# Patient Record
Sex: FEMALE | URBAN - METROPOLITAN AREA
[De-identification: names, ages, dates, MRNs, and addresses within clinical notes are randomized per-mention and may not be internally consistent; named-entity substitution may affect disease eponyms.]

---

## 2020-07-20 ENCOUNTER — APPOINTMENT (OUTPATIENT)
Dept: URBAN - METROPOLITAN AREA CLINIC 319 | Age: 80
Setting detail: DERMATOLOGY
End: 2020-07-20

## 2020-07-20 DIAGNOSIS — L82.1 OTHER SEBORRHEIC KERATOSIS: ICD-10-CM

## 2020-07-20 DIAGNOSIS — D22 MELANOCYTIC NEVI: ICD-10-CM

## 2020-07-20 DIAGNOSIS — Z71.89 OTHER SPECIFIED COUNSELING: ICD-10-CM

## 2020-07-20 DIAGNOSIS — L82.0 INFLAMED SEBORRHEIC KERATOSIS: ICD-10-CM

## 2020-07-20 PROBLEM — D22.9 MELANOCYTIC NEVI, UNSPECIFIED: Status: ACTIVE | Noted: 2020-07-20

## 2020-07-20 PROCEDURE — 17110 DESTRUCT B9 LESION 1-14: CPT

## 2020-07-20 PROCEDURE — OTHER COUNSELING: OTHER

## 2020-07-20 PROCEDURE — OTHER LIQUID NITROGEN: OTHER

## 2020-07-20 PROCEDURE — OTHER MIPS QUALITY: OTHER

## 2020-07-20 PROCEDURE — 99203 OFFICE O/P NEW LOW 30 MIN: CPT | Mod: 25

## 2020-07-20 PROCEDURE — OTHER SUNSCREEN RECOMMENDATIONS: OTHER

## 2020-07-20 PROCEDURE — OTHER REASSURANCE: OTHER

## 2020-07-20 ASSESSMENT — LOCATION SIMPLE DESCRIPTION DERM: LOCATION SIMPLE: RIGHT ANTERIOR NECK

## 2020-07-20 ASSESSMENT — LOCATION DETAILED DESCRIPTION DERM: LOCATION DETAILED: RIGHT INFERIOR LATERAL NECK

## 2020-07-20 ASSESSMENT — LOCATION ZONE DERM: LOCATION ZONE: NECK

## 2020-07-20 NOTE — PROCEDURE: LIQUID NITROGEN
Medical Necessity Information: It is in your best interest to select a reason for this procedure from the list below. All of these items fulfill various CMS LCD requirements except the new and changing color options.
Duration Of Freeze Thaw-Cycle (Seconds): 5-10
Detail Level: Detailed
Add 52 Modifier (Optional): no
Medical Necessity Clause: This procedure was medically necessary because the lesions that were treated were:
Consent: The patient's consent was obtained including but not limited to risks of crusting, scabbing, blistering, scarring, darker or lighter pigmentary change, recurrence, incomplete removal and infection.
Number Of Freeze-Thaw Cycles: 1 freeze-thaw cycle
Post-Care Instructions: I reviewed with the patient in detail post-care instructions. Patient is to wear sunprotection, and avoid picking at any of the treated lesions. Pt may apply Vaseline to crusted or scabbing areas.

## 2022-09-12 ENCOUNTER — APPOINTMENT (OUTPATIENT)
Dept: URBAN - METROPOLITAN AREA CLINIC 319 | Age: 82
Setting detail: DERMATOLOGY
End: 2022-09-12

## 2022-09-12 DIAGNOSIS — D18.0 HEMANGIOMA: ICD-10-CM

## 2022-09-12 DIAGNOSIS — D49.2 NEOPLASM OF UNSPECIFIED BEHAVIOR OF BONE, SOFT TISSUE, AND SKIN: ICD-10-CM

## 2022-09-12 DIAGNOSIS — L82.1 OTHER SEBORRHEIC KERATOSIS: ICD-10-CM

## 2022-09-12 DIAGNOSIS — D22 MELANOCYTIC NEVI: ICD-10-CM

## 2022-09-12 DIAGNOSIS — Z12.83 ENCOUNTER FOR SCREENING FOR MALIGNANT NEOPLASM OF SKIN: ICD-10-CM

## 2022-09-12 PROBLEM — D18.01 HEMANGIOMA OF SKIN AND SUBCUTANEOUS TISSUE: Status: ACTIVE | Noted: 2022-09-12

## 2022-09-12 PROBLEM — D22.5 MELANOCYTIC NEVI OF TRUNK: Status: ACTIVE | Noted: 2022-09-12

## 2022-09-12 PROCEDURE — 11102 TANGNTL BX SKIN SINGLE LES: CPT

## 2022-09-12 PROCEDURE — 99213 OFFICE O/P EST LOW 20 MIN: CPT | Mod: 25

## 2022-09-12 PROCEDURE — OTHER MIPS QUALITY: OTHER

## 2022-09-12 PROCEDURE — OTHER PHOTO-DOCUMENTATION: OTHER

## 2022-09-12 PROCEDURE — OTHER COUNSELING: OTHER

## 2022-09-12 PROCEDURE — OTHER REASSURANCE: OTHER

## 2022-09-12 PROCEDURE — OTHER BIOPSY BY SHAVE METHOD: OTHER

## 2022-09-12 ASSESSMENT — LOCATION DETAILED DESCRIPTION DERM
LOCATION DETAILED: RIGHT INFERIOR UPPER BACK
LOCATION DETAILED: NASAL SUPRATIP
LOCATION DETAILED: LEFT ANTERIOR PROXIMAL UPPER ARM
LOCATION DETAILED: RIGHT SUPERIOR MEDIAL UPPER BACK
LOCATION DETAILED: RIGHT ANTERIOR PROXIMAL UPPER ARM
LOCATION DETAILED: PERIUMBILICAL SKIN

## 2022-09-12 ASSESSMENT — LOCATION SIMPLE DESCRIPTION DERM
LOCATION SIMPLE: RIGHT UPPER ARM
LOCATION SIMPLE: LEFT UPPER ARM
LOCATION SIMPLE: RIGHT UPPER BACK
LOCATION SIMPLE: ABDOMEN
LOCATION SIMPLE: NOSE

## 2022-09-12 ASSESSMENT — LOCATION ZONE DERM
LOCATION ZONE: TRUNK
LOCATION ZONE: ARM
LOCATION ZONE: NOSE

## 2022-09-12 NOTE — PROCEDURE: BIOPSY BY SHAVE METHOD
Hide Anticipated Plan (Based On Presumed Biopsy Results)?: No
Hemostasis: Electrodesiccation
Post-Care Instructions: I reviewed with the patient in detail post-care instructions. Patient is to keep the biopsy site dry overnight, and then apply bacitracin twice daily until healed. Patient may apply hydrogen peroxide soaks to remove any crusting.
Billing Type: Third-Party Bill
Biopsy Method: Dermablade
Was A Bandage Applied: Yes
Curettage Text: The wound bed was treated with curettage after the biopsy was performed.
Biopsy Type: H and E
Electrodesiccation Text: The wound bed was treated with electrodesiccation after the biopsy was performed.
X Size Of Lesion In Cm: 0
Depth Of Biopsy: dermis
Notification Instructions: Patient will be notified of biopsy results. However, patient instructed to call the office if not contacted within 2 weeks.
Detail Level: Detailed
Electrodesiccation And Curettage Text: The wound bed was treated with electrodesiccation and curettage after the biopsy was performed.
Dressing: Band-Aid
Silver Nitrate Text: The wound bed was treated with silver nitrate after the biopsy was performed.
Consent: Written consent was obtained and risks were reviewed including but not limited to scarring, infection, bleeding, scabbing, incomplete removal, nerve damage and allergy to anesthesia.
Anesthesia Volume In Cc (Will Not Render If 0): 0.5
Anesthesia Type: 1% lidocaine with epinephrine
Wound Care: Aquaphor
Type Of Destruction Used: Cryotherapy
Information: Selecting Yes will display possible errors in your note based on the variables you have selected. This validation is only offered as a suggestion for you. PLEASE NOTE THAT THE VALIDATION TEXT WILL BE REMOVED WHEN YOU FINALIZE YOUR NOTE. IF YOU WANT TO FAX A PRELIMINARY NOTE YOU WILL NEED TO TOGGLE THIS TO 'NO' IF YOU DO NOT WANT IT IN YOUR FAXED NOTE.
Cryotherapy Text: The wound bed was treated with cryotherapy after the biopsy was performed.

## 2022-09-12 NOTE — PROCEDURE: MIPS QUALITY
Quality 226: Preventive Care And Screening: Tobacco Use: Screening And Cessation Intervention: Patient screened for tobacco use and is an ex/non-smoker
Quality 111:Pneumonia Vaccination Status For Older Adults: Pneumococcal vaccine (PPSV23) administered on or after patient’s 60th birthday and before the end of the measurement period
Quality 431: Preventive Care And Screening: Unhealthy Alcohol Use - Screening: Patient not identified as an unhealthy alcohol user when screened for unhealthy alcohol use using a systematic screening method
Quality 130: Documentation Of Current Medications In The Medical Record: Current Medications Documented
Quality 110: Preventive Care And Screening: Influenza Immunization: Influenza Immunization Administered during Influenza season
Detail Level: Detailed

## 2022-10-20 ENCOUNTER — APPOINTMENT (OUTPATIENT)
Dept: URBAN - METROPOLITAN AREA CLINIC 319 | Age: 82
Setting detail: DERMATOLOGY
End: 2022-10-25

## 2022-10-20 PROBLEM — C44.311 BASAL CELL CARCINOMA OF SKIN OF NOSE: Status: ACTIVE | Noted: 2022-10-20

## 2022-10-20 PROCEDURE — G6001 ECHO GUIDANCE RADIOTHERAPY: HCPCS

## 2022-10-20 PROCEDURE — 77280 THER RAD SIMULAJ FIELD SMPL: CPT

## 2022-10-20 PROCEDURE — OTHER SUPERFICIAL RADIATION TREATMENT: OTHER

## 2022-10-20 PROCEDURE — OTHER FOLLOW UP FOR NEXT VISIT: OTHER

## 2022-10-20 PROCEDURE — 77300 RADIATION THERAPY DOSE PLAN: CPT

## 2022-10-20 PROCEDURE — OTHER TREATMENT REGIMEN: OTHER

## 2022-10-20 PROCEDURE — 77261 THER RADIOLOGY TX PLNG SMPL: CPT

## 2022-10-20 PROCEDURE — 77332 RADIATION TREATMENT AID(S): CPT

## 2022-10-20 NOTE — PROCEDURE: SUPERFICIAL RADIATION TREATMENT
Render Patient Eligibility And Selection In Note?: Yes
Prescription Used: 1
Dimensions-X Axis In Cm: 1.2
Include Rx 5 When Rendering Additional Prescriptions: No
Information: Selecting Yes will display possible errors in your note based on the variables you have selected. This validation is only offered as a suggestion for you. PLEASE NOTE THAT THE VALIDATION TEXT WILL BE REMOVED WHEN YOU FINALIZE YOUR NOTE. IF YOU WANT TO FAX A PRELIMINARY NOTE YOU WILL NEED TO TOGGLE THIS TO 'NO' IF YOU DO NOT WANT IT IN YOUR FAXED NOTE.
Ssd In Cm (Optional): 15
Total Dose (Optional-Please Include Units): 5553.00 cGy
Port Dimensions-X Axis In Cm: 1.7
Patient Positioning: Sitting
Initial Radiation Treatment Planning (Will Render If Bill Simulation = Yes): The patient had a complete consultation regarding all applicable modalities for the treatment of their skin cancer and based on a variety of factors including the type of tumor, size, and location, the relevant medical history as well as local tissue factors, the functional status of the individual, the ability to perform necessary postoperative wound instructions and the need for simultaneous treatments as well as overall wound healing status, it was determined that the patient would begin radiation therapy treatment for skin cancer.  A full simulation and treatment device design was performed including the determination and formulation of appropriate simple and complex devices including lead shield of 0.762 mm thickness to form molded customized shielding to specifically correlate with the lesion size including treatment margin.  The custom lead shield is adequate to accommodate the appropriate applicator and provide adequate shielding around the treatment site.  The specific field applicator, shields, and devices both simple and complex as well as the specific patient setup is outlined below.  The patient was given a full consent for superficial radiation to both verbally and in writing and the full determination of patient's eligibility for treatment and selection is outlined on the patient eligibility and treatment selection form.  The specific superficial radiotherapy prescription was determined and was documented on the superficial radiotherapy prescription form.  A treatment calculation was also performed and documented on the treatment calculation form.  Based on the prescription, the patient was scheduled for a series of fractional treatments.
Intro Statement (Will Not Render If Left Blank): Plan: Per the request of Dr. Arias, the patient was seen today for Superficial Radiation Therapy planning requiring initial simulation \\tayler preparation for treatment of specific diseased sites. Simulation is necessary to determine the correct patient and treatment \\nportal positioning, to deliver safe and effective radiation therapy. A high-frequency ultrasound image was acquired prior to \\ntreatment today for two- dimensional evaluation of tumor volume and response to treatment throughout course of care, in \\naddition, to geometric accuracy of field placement. Physician evaluation of the ultrasound tumor depth, width, and breadth will \\nbe ongoing through the course of treatment and is deemed medically necessary ensuring efficacy of treatment. Today’s image \\nand setup were evaluated to determine the initiation of treatment with the current plan or necessary changes as appropriate. \\Eleazar appropriate custom blocking and treatment parameters were verified by the radiation therapist according to the initial \\nsimulation. Ultrasound image guidance and field placement prior to treatment delivery were performed. \\n\\nUltrasound depth is 1.40 mm \\n\\nPer Dr. Arias, continued daily ultrasound guidance and simulation are required for field placement, measurement of tumor depth,\\nwidth and breadth, progress, and edema monitoring. \\n\\nPer the request of Dr. Arias , continuing medical physics review as per radiotherapy standard of care post every 5th fraction for the patient, including assessment of treatment parameters,  of dose delivery, and review of patient treatment \\ndocumentation in support the provider, is ordered, ensuring efficacy and continued safe delivery of radiotherapy. Included \\tayler the physics check is a review of patient setup information, all pertinent simulation and treatment photograph(s) checks, \\nprescription, dose calculation verification, daily dose charted correctly, elapsed days and treatment days correctly charted, \\ncumulative dose correct, and review of any prescription changes. Continued medical physics review post every 5th fraction of \\nradiotherapy is requested by the provider for appropriate radiotherapy management and is deemed medically necessary and \\nstandard of care.
Depth (Optional-Please Include Units): 2.86
Fractions / Week Rx 4: 5
Functional Status: 0 (fully active)
Field Size (Applicator): 2.5 cm
Treatment Time In Min (Optional): .45
Simple Simulation Preamble Text Will Be Included With Simple Simulations (.......... Indications): Simple simulation was performed today for the following reasons:
Please Choose The Type Of Visit (Required): Treatment Planning Visit: Show Non-Treatment Variables
Use Automated Fraction Number And Cumulative Dosage?: No (Maintain Current Freetext Entry)
Energy (Optional-Please Include Units): 100 kV
Time Dose Fractionation (Optional- Include Units If Applicable): 99
Ultrasound Used Text: Ultrasound was utilized to place radiation therapy fields.
Treatment Time / Fractionation (Optional- Include Units): .45 min
Dose / Tx In Cgy (Optional): 277.65
Total Number Of Fractions: 20
Assessment: Appropriate reaction
Custom Shielding Preamble Text Will Not Be Included With Simple Simulations (.......... X X Y Cm): A lead shield of 0.762 mm thickness is utilized to form a molded, custom shield with a
Ultrasound Used Text: .
Radiation Units: cGy
Treatment Device Design After Initial Simulation Justification (Will Render If Bill For Treatment Devices = Yes): The patient is status post radiation simulation and is evaluated as to the use of additional devices for shielding and placement for radiation therapy.
Detail Level: Detailed
Fractions / Week: 3
Fractionation Number: 0
Field Size (Applicator): 2.0 cm
Treatment Margins In Cm: 0.5
Energy (Include Units): 100
Custom Shielding Afterword Text Will Not Be Included With Simple Simulations (X X Y Cm............): port to correlate with the lesion size, including treatment margin. The custom lead shield is adequate to accommodate the appropriate applicator and provide adequate shielding around the treatment site. Additional shielding (as noted below) is used to protect sensitive, normal tissues.
Daily Fractionated Dose (Optional- Include Units): 277.65 cGy

## 2022-10-20 NOTE — PROCEDURE: TREATMENT REGIMEN
Plan: Per the request of Dr. ROSE Arias, the patient was seen today for Superficial Radiation Therapy planning \\nrequiring initial simulation in preparation for treatment of specific diseased sites. Simulation is necessary to \\ndetermine the correct patient and treatment portal positioning, to deliver safe and effective radiation \\ntherapy. A high-frequency ultrasound image was acquired prior to treatment today for two- dimensional \\nevaluation of tumor volume and will be repeated per fraction for response to treatment, in addition, to \\ngeometric accuracy of field placement. Physician evaluation of the ultrasound tumor depth, width, and \\nbreadth will be ongoing through the course of treatment and is deemed medically necessary ensuring \\nefficacy of treatment and determine whether to proceed with delivery of therapeutic. Today’s image and \\nsetup were evaluated to determine the initiation of treatment. All appropriate blocking and treatment \\nparameters were verified by the radiation therapist and provider.\\n\\nUltrasound depth is 2.86 mm.\\n\\nPer Dr. ROSE Arias, continued per fraction ultrasound guidance and simulation are required for field placement, \\nmeasurement of tumor depth, width and breadth, progress, and edema monitoring.\\n\\nPer the request of Dr. ROSE Arias, continuing medical physics review as per radiotherapy standard of care post every \\n5th fraction for the patient, including assessment of treatment parameters,  of dose \\ndelivery, and review of patient treatment documentation in support of the provider, is ordered, ensuring \\nefficacy and continued safe delivery of radiotherapy. Included in the physics check is a review of patient \\nsetup information, all pertinent simulation and treatment photograph(s) checks, prescription, dose \\ncalculation verification, per fraction dose charted correctly, elapsed days and treatment days correctly \\ncharted, cumulative dose correct, and review of any prescription changes. Continued medical physics review \\npost every 5th fraction of radiotherapy is requested by the provider for appropriate radiotherapy \\nmanagement and is deemed medically necessary and standard of care
Detail Level: Detailed

## 2022-10-24 ENCOUNTER — APPOINTMENT (OUTPATIENT)
Dept: URBAN - METROPOLITAN AREA CLINIC 319 | Age: 82
Setting detail: DERMATOLOGY
End: 2022-10-25

## 2022-10-24 PROBLEM — C44.311 BASAL CELL CARCINOMA OF SKIN OF NOSE: Status: ACTIVE | Noted: 2022-10-24

## 2022-10-24 PROCEDURE — G6001 ECHO GUIDANCE RADIOTHERAPY: HCPCS | Mod: XU

## 2022-10-24 PROCEDURE — 77280 THER RAD SIMULAJ FIELD SMPL: CPT

## 2022-10-24 PROCEDURE — OTHER FOLLOW UP FOR NEXT VISIT: OTHER

## 2022-10-24 PROCEDURE — OTHER TREATMENT REGIMEN: OTHER

## 2022-10-24 PROCEDURE — 77401 RADIATION TX DELIVERY SUPFC: CPT

## 2022-10-24 PROCEDURE — OTHER SUPERFICIAL RADIATION TREATMENT: OTHER

## 2022-10-24 NOTE — PROCEDURE: TREATMENT REGIMEN
Detail Level: Detailed
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 Written informed consent has been previously obtained from this patient for this treatment. This \\nconsent is documented in the patient's chart. The patient gave verbal consent to continue treatment today. \\nThe patient was treated with a specific radiation dose and setup as prescribed by the provider listed on this \\nvisit note. A Radiation Therapist performed administration of radiation under the supervision of a provider. \\nThe treatment parameters and cumulative dose are indicated above. Prior to administering the radiation, the \\npatient underwent a verification therapeutic radiology simulation-aided field setting defining relevant normal \\nand abnormal target anatomy and acquiring images with a separate and distinct diagnostic high-frequency \\nultrasound to delineate tissues and determine whether to proceed with delivery of therapeutic, in addition to \\nretrieve data necessary to develop an optimal radiation treatment process for the patient. The field \\nplacement simulation documents any change seen in overall tumor volume documented in the patient’s \\nrecord, allows the clinician to indicate any needed changes in the treatment plan and/or prescription, \\nprovides diagnostic evaluation as the basis for performing the therapeutic procedure, and clearly identifies \\nthe information needed to decide to proceed with the therapeutic procedure. This process includes \\nverification of the treatment port and proper treatment positioning. All treatment ports were \\nphotographed within electronic medical records. The patient's lead blocking along with gross tumor volume \\nand margin was confirmed. Considering superficial radiotherapy is clinical in setup, this requires the physician \\nand radiation therapist to clarify the location interest being treated against initial images, ultrasound, \\npathology, and patient anatomy. Care was taken to ensure snell treated were geometrically accurate and \\nproperly positioned using therapeutic radiology simulation-aided field setting verification per fraction. This \\nprocess is also utilized to determine if any prescription or setup changes are necessary. These steps are \\ntherefore medically necessary to ensure safe and effective administration of radiation. Ongoing therapeutic \\nradiology simulation-aided field setting verification is ordered throughout the course of therapy.\\n\\nA high-frequency ultrasound image was acquired today for a two-dimensional evaluation of the tumor \\nvolume, depth, width, breadth, review of prior response to treatment, provide geometric accuracy of field \\nplacement, and determine whether to proceed with therapeutic delivery.\\n\\nHigh frequency ultrasound depth is 2.30 mm , which is - 0.56 mm in difference from previous imaging.\\n\\nThe field placement and ultrasound imaging, per fraction, is separate and distinct from the initial simulation \\nand is an important task in providing safe administration of superficial radiation therapy. Physician evaluation \\nof the ultrasound information will be ongoing throughout the course of treatment and is deemed medically \\nnecessary to ensure the efficacy of treatment, whether to proceed with therapeutic delivery, and determine\\apollo necessary changes. Today's images were evaluated for determination of continuation of treatment with \\nthe current plan or with necessary changes as appropriate. According to the review of verification \\ntherapeutic radiology simulation-aided field setting and imaging, no change is required. \\nAdditionally, the use of ultrasound visualization and targeted assessment allows the patient to be able to see \\nher cancer progress, encouraging the patient to complete and maintain compliance through the full \\ncourse of prescribed radiotherapy. Per Dr. ROSE Arias, continued ultrasound guidance and therapeutic radiology \\nsimulation-aided field setting verification per fraction is required for field placement, measurement of tumor \\ndepth, tissues evaluation, progress, acute effect monitoring, and determination for therapeutic treatment \\ndelivery is appropriate.

## 2022-10-24 NOTE — PROCEDURE: SUPERFICIAL RADIATION TREATMENT
No complaints No complaints Information: Selecting Yes will display possible errors in your note based on the variables you have selected. This validation is only offered as a suggestion for you. PLEASE NOTE THAT THE VALIDATION TEXT WILL BE REMOVED WHEN YOU FINALIZE YOUR NOTE. IF YOU WANT TO FAX A PRELIMINARY NOTE YOU WILL NEED TO TOGGLE THIS TO 'NO' IF YOU DO NOT WANT IT IN YOUR FAXED NOTE. No complaints

## 2022-10-24 NOTE — PROCEDURE: SUPERFICIAL RADIATION TREATMENT
Intro Statement (Will Not Render If Left Blank): Plan: Per the request of Dr. Arias, the patient was seen today for Superficial Radiation Therapy planning requiring initial simulation \\tayler preparation for treatment of specific diseased sites. Simulation is necessary to determine the correct patient and treatment \\nportal positioning, to deliver safe and effective radiation therapy. A high-frequency ultrasound image was acquired prior to \\ntreatment today for two- dimensional evaluation of tumor volume and response to treatment throughout course of care, in \\naddition, to geometric accuracy of field placement. Physician evaluation of the ultrasound tumor depth, width, and breadth will \\nbe ongoing through the course of treatment and is deemed medically necessary ensuring efficacy of treatment. Today’s image \\nand setup were evaluated to determine the initiation of treatment with the current plan or necessary changes as appropriate. \\Eleazar appropriate custom blocking and treatment parameters were verified by the radiation therapist according to the initial \\nsimulation. Ultrasound image guidance and field placement prior to treatment delivery were performed. \\n\\nUltrasound depth is 1.40 mm \\n\\nPer Dr. rAias, continued daily ultrasound guidance and simulation are required for field placement, measurement of tumor depth,\\nwidth and breadth, progress, and edema monitoring. \\n\\nPer the request of Dr. Arias , continuing medical physics review as per radiotherapy standard of care post every 5th fraction for the patient, including assessment of treatment parameters,  of dose delivery, and review of patient treatment \\ndocumentation in support the provider, is ordered, ensuring efficacy and continued safe delivery of radiotherapy. Included \\tayler the physics check is a review of patient setup information, all pertinent simulation and treatment photograph(s) checks, \\nprescription, dose calculation verification, daily dose charted correctly, elapsed days and treatment days correctly charted, \\ncumulative dose correct, and review of any prescription changes. Continued medical physics review post every 5th fraction of \\nradiotherapy is requested by the provider for appropriate radiotherapy management and is deemed medically necessary and \\nstandard of care. Intro Statement (Will Not Render If Left Blank): Plan: Per the request of Dr. Arias, the patient was seen today for Superficial Radiation Therapy planning requiring initial simulation \\tayler preparation for treatment of specific diseased sites. Simulation is necessary to determine the correct patient and treatment \\nportal positioning, to deliver safe and effective radiation therapy. A high-frequency ultrasound image was acquired prior to \\ntreatment today for two- dimensional evaluation of tumor volume and response to treatment throughout course of care, in \\naddition, to geometric accuracy of field placement. Physician evaluation of the ultrasound tumor depth, width, and breadth will \\nbe ongoing through the course of treatment and is deemed medically necessary ensuring efficacy of treatment. Today’s image \\nand setup were evaluated to determine the initiation of treatment with the current plan or necessary changes as appropriate. \\Eleazar appropriate custom blocking and treatment parameters were verified by the radiation therapist according to the initial \\nsimulation. Ultrasound image guidance and field placement prior to treatment delivery were performed. \\n\\nUltrasound depth is 1.40 mm \\n\\nPer Dr. Arias, continued daily ultrasound guidance and simulation are required for field placement, measurement of tumor depth,\\nwidth and breadth, progress, and edema monitoring. \\n\\nPer the request of Dr. Arias , continuing medical physics review as per radiotherapy standard of care post every 5th fraction for the patient, including assessment of treatment parameters,  of dose delivery, and review of patient treatment \\ndocumentation in support the provider, is ordered, ensuring efficacy and continued safe delivery of radiotherapy. Included \\tayler the physics check is a review of patient setup information, all pertinent simulation and treatment photograph(s) checks, \\nprescription, dose calculation verification, daily dose charted correctly, elapsed days and treatment days correctly charted, \\ncumulative dose correct, and review of any prescription changes. Continued medical physics review post every 5th fraction of \\nradiotherapy is requested by the provider for appropriate radiotherapy management and is deemed medically necessary and \\nstandard of care.

## 2022-10-25 ENCOUNTER — APPOINTMENT (OUTPATIENT)
Dept: URBAN - METROPOLITAN AREA CLINIC 319 | Age: 82
Setting detail: DERMATOLOGY
End: 2022-10-27

## 2022-10-25 PROBLEM — C44.311 BASAL CELL CARCINOMA OF SKIN OF NOSE: Status: ACTIVE | Noted: 2022-10-25

## 2022-10-25 PROCEDURE — OTHER SUPERFICIAL RADIATION TREATMENT: OTHER

## 2022-10-25 PROCEDURE — 77401 RADIATION TX DELIVERY SUPFC: CPT

## 2022-10-25 PROCEDURE — OTHER FOLLOW UP FOR NEXT VISIT: OTHER

## 2022-10-25 PROCEDURE — G6001 ECHO GUIDANCE RADIOTHERAPY: HCPCS | Mod: XU

## 2022-10-25 PROCEDURE — 77280 THER RAD SIMULAJ FIELD SMPL: CPT

## 2022-10-25 PROCEDURE — OTHER TREATMENT REGIMEN: OTHER

## 2022-10-25 NOTE — PROCEDURE: TREATMENT REGIMEN
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 Written informed consent has been previously obtained from this patient for this treatment. This \\nconsent is documented in the patient's chart. The patient gave verbal consent to continue treatment today. \\nThe patient was treated with a specific radiation dose and setup as prescribed by the provider listed on this \\nvisit note. A Radiation Therapist performed administration of radiation under the supervision of a provider. \\nThe treatment parameters and cumulative dose are indicated above. Prior to administering the radiation, the \\npatient underwent a verification therapeutic radiology simulation-aided field setting defining relevant normal \\nand abnormal target anatomy and acquiring images with a separate and distinct diagnostic high-frequency \\nultrasound to delineate tissues and determine whether to proceed with delivery of therapeutic, in addition to \\nretrieve data necessary to develop an optimal radiation treatment process for the patient. The field \\nplacement simulation documents any change seen in overall tumor volume documented in the patient’s \\nrecord, allows the clinician to indicate any needed changes in the treatment plan and/or prescription, \\nprovides diagnostic evaluation as the basis for performing the therapeutic procedure, and clearly identifies \\nthe information needed to decide to proceed with the therapeutic procedure. This process includes \\nverification of the treatment port and proper treatment positioning. All treatment ports were \\nphotographed within electronic medical records. The patient's lead blocking along with gross tumor volume \\nand margin was confirmed. Considering superficial radiotherapy is clinical in setup, this requires the physician \\nand radiation therapist to clarify the location interest being treated against initial images, ultrasound, \\npathology, and patient anatomy. Care was taken to ensure snell treated were geometrically accurate and \\nproperly positioned using therapeutic radiology simulation-aided field setting verification per fraction. This \\nprocess is also utilized to determine if any prescription or setup changes are necessary. These steps are \\ntherefore medically necessary to ensure safe and effective administration of radiation. Ongoing therapeutic \\nradiology simulation-aided field setting verification is ordered throughout the course of therapy.\\n\\nA high-frequency ultrasound image was acquired today for a two-dimensional evaluation of the tumor \\nvolume, depth, width, breadth, review of prior response to treatment, provide geometric accuracy of field \\nplacement, and determine whether to proceed with therapeutic delivery.\\n\\nHigh frequency ultrasound depth is 2.27 mm , which is - 0.03 mm in difference from previous imaging.\\n\\nThe field placement and ultrasound imaging, per fraction, is separate and distinct from the initial simulation \\nand is an important task in providing safe administration of superficial radiation therapy. Physician evaluation \\nof the ultrasound information will be ongoing throughout the course of treatment and is deemed medically \\nnecessary to ensure the efficacy of treatment, whether to proceed with therapeutic delivery, and determine\\apollo necessary changes. Today's images were evaluated for determination of continuation of treatment with \\nthe current plan or with necessary changes as appropriate. According to the review of verification \\ntherapeutic radiology simulation-aided field setting and imaging, no change is required. \\nAdditionally, the use of ultrasound visualization and targeted assessment allows the patient to be able to see \\nher cancer progress, encouraging the patient to complete and maintain compliance through the full \\ncourse of prescribed radiotherapy. Per Dr. ROSE Arias, continued ultrasound guidance and therapeutic radiology \\nsimulation-aided field setting verification per fraction is required for field placement, measurement of tumor \\ndepth, tissues evaluation, progress, acute effect monitoring, and determination for therapeutic treatment \\ndelivery is appropriate.
Detail Level: Detailed

## 2022-10-27 ENCOUNTER — APPOINTMENT (OUTPATIENT)
Dept: URBAN - METROPOLITAN AREA CLINIC 319 | Age: 82
Setting detail: DERMATOLOGY
End: 2022-10-28

## 2022-10-27 PROBLEM — C44.311 BASAL CELL CARCINOMA OF SKIN OF NOSE: Status: ACTIVE | Noted: 2022-10-27

## 2022-10-27 PROCEDURE — G6001 ECHO GUIDANCE RADIOTHERAPY: HCPCS | Mod: XU

## 2022-10-27 PROCEDURE — OTHER FOLLOW UP FOR NEXT VISIT: OTHER

## 2022-10-27 PROCEDURE — 77401 RADIATION TX DELIVERY SUPFC: CPT

## 2022-10-27 PROCEDURE — 77280 THER RAD SIMULAJ FIELD SMPL: CPT

## 2022-10-27 PROCEDURE — OTHER SUPERFICIAL RADIATION TREATMENT: OTHER

## 2022-10-27 PROCEDURE — OTHER TREATMENT REGIMEN: OTHER

## 2022-10-27 NOTE — PROCEDURE: TREATMENT REGIMEN
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 Written informed consent has been previously obtained from this patient for this treatment. This \\nconsent is documented in the patient's chart. The patient gave verbal consent to continue treatment today. \\nThe patient was treated with a specific radiation dose and setup as prescribed by the provider listed on this \\nvisit note. A Radiation Therapist performed administration of radiation under the supervision of a provider. \\nThe treatment parameters and cumulative dose are indicated above. Prior to administering the radiation, the \\npatient underwent a verification therapeutic radiology simulation-aided field setting defining relevant normal \\nand abnormal target anatomy and acquiring images with a separate and distinct diagnostic high-frequency \\nultrasound to delineate tissues and determine whether to proceed with delivery of therapeutic, in addition to \\nretrieve data necessary to develop an optimal radiation treatment process for the patient. The field \\nplacement simulation documents any change seen in overall tumor volume documented in the patient’s \\nrecord, allows the clinician to indicate any needed changes in the treatment plan and/or prescription, \\nprovides diagnostic evaluation as the basis for performing the therapeutic procedure, and clearly identifies \\nthe information needed to decide to proceed with the therapeutic procedure. This process includes \\nverification of the treatment port and proper treatment positioning. All treatment ports were \\nphotographed within electronic medical records. The patient's lead blocking along with gross tumor volume \\nand margin was confirmed. Considering superficial radiotherapy is clinical in setup, this requires the physician \\nand radiation therapist to clarify the location interest being treated against initial images, ultrasound, \\npathology, and patient anatomy. Care was taken to ensure snell treated were geometrically accurate and \\nproperly positioned using therapeutic radiology simulation-aided field setting verification per fraction. This \\nprocess is also utilized to determine if any prescription or setup changes are necessary. These steps are \\ntherefore medically necessary to ensure safe and effective administration of radiation. Ongoing therapeutic \\nradiology simulation-aided field setting verification is ordered throughout the course of therapy.\\n\\nA high-frequency ultrasound image was acquired today for a two-dimensional evaluation of the tumor \\nvolume, depth, width, breadth, review of prior response to treatment, provide geometric accuracy of field \\nplacement, and determine whether to proceed with therapeutic delivery.\\n\\nHigh frequency ultrasound depth is 2.50 mm , which is + 0.23 mm in difference from previous imaging.\\n\\nThe field placement and ultrasound imaging, per fraction, is separate and distinct from the initial simulation \\nand is an important task in providing safe administration of superficial radiation therapy. Physician evaluation \\nof the ultrasound information will be ongoing throughout the course of treatment and is deemed medically \\nnecessary to ensure the efficacy of treatment, whether to proceed with therapeutic delivery, and determine\\apollo necessary changes. Today's images were evaluated for determination of continuation of treatment with \\nthe current plan or with necessary changes as appropriate. According to the review of verification \\ntherapeutic radiology simulation-aided field setting and imaging, no change is required. \\nAdditionally, the use of ultrasound visualization and targeted assessment allows the patient to be able to see \\nher cancer progress, encouraging the patient to complete and maintain compliance through the full \\ncourse of prescribed radiotherapy. Per Dr. ROSE Arias, continued ultrasound guidance and therapeutic radiology \\nsimulation-aided field setting verification per fraction is required for field placement, measurement of tumor \\ndepth, tissues evaluation, progress, acute effect monitoring, and determination for therapeutic treatment \\ndelivery is appropriate.
Detail Level: Detailed

## 2022-10-27 NOTE — PROCEDURE: SUPERFICIAL RADIATION TREATMENT
Number Of Days Off Treatment: 1
Total Number Of Fractions Rx 2: 15
Simple Simulation Afterword Text Will Be Included With Simple Simulations (Indications............): The patient had a complete consultation regarding all applicable modalities for the treatment of their skin cancer and based on a variety of factors including the type of tumor, size, and location, the relevant medical history as well as local tissue factors, the functional status of the individual, the ability to perform necessary postoperative wound instructions and the need for simultaneous treatments as well as overall wound healing status, it was determined that the patient would begin radiation therapy treatment for skin cancer.  A full simulation and treatment device design was performed including the determination and formulation of appropriate simple and complex devices including lead shield of 0.762 mm thickness to form molded customized shielding to specifically correlate with the lesion size including treatment margin.  The custom lead shield is adequate to accommodate the appropriate applicator and provide adequate shielding around the treatment site.  The specific field applicator, shields, and devices both simple and complex as well as the specific patient setup is outlined below.  The patient was given a full consent for superficial radiation to both verbally and in writing and the full determination of patient's eligibility for treatment and selection is outlined on the patient eligibility and treatment selection form.  The specific superficial radiotherapy prescription was determined and was documented on the superficial radiotherapy prescription form.  A treatment calculation was also performed and documented on the treatment calculation form.  Based on the prescription, the patient was scheduled for a series of fractional treatments.
Intro Statement (Will Not Render If Left Blank): Plan: Per the request of Dr. Arias, the patient was seen today for Superficial Radiation Therapy planning requiring initial simulation \\tayler preparation for treatment of specific diseased sites. Simulation is necessary to determine the correct patient and treatment \\nportal positioning, to deliver safe and effective radiation therapy. A high-frequency ultrasound image was acquired prior to \\ntreatment today for two- dimensional evaluation of tumor volume and response to treatment throughout course of care, in \\naddition, to geometric accuracy of field placement. Physician evaluation of the ultrasound tumor depth, width, and breadth will \\nbe ongoing through the course of treatment and is deemed medically necessary ensuring efficacy of treatment. Today’s image \\nand setup were evaluated to determine the initiation of treatment with the current plan or necessary changes as appropriate. \\Eleazar appropriate custom blocking and treatment parameters were verified by the radiation therapist according to the initial \\nsimulation. Ultrasound image guidance and field placement prior to treatment delivery were performed. \\n\\nUltrasound depth is 1.40 mm \\n\\nPer Dr. Arias, continued daily ultrasound guidance and simulation are required for field placement, measurement of tumor depth,\\nwidth and breadth, progress, and edema monitoring. \\n\\nPer the request of Dr. Arias , continuing medical physics review as per radiotherapy standard of care post every 5th fraction for the patient, including assessment of treatment parameters,  of dose delivery, and review of patient treatment \\ndocumentation in support the provider, is ordered, ensuring efficacy and continued safe delivery of radiotherapy. Included \\tayler the physics check is a review of patient setup information, all pertinent simulation and treatment photograph(s) checks, \\nprescription, dose calculation verification, daily dose charted correctly, elapsed days and treatment days correctly charted, \\ncumulative dose correct, and review of any prescription changes. Continued medical physics review post every 5th fraction of \\nradiotherapy is requested by the provider for appropriate radiotherapy management and is deemed medically necessary and \\nstandard of care.
Treatment Time In Min (Optional): .45
Change Daily Dosage Administered Mid Treatment?: No
Fractions / Week: 3
Energy (Optional-Please Include Units): 100 kV
Validate Note Data (See Information Below): Yes
Fractions / Week Rx 2: 5
Custom Shielding Preamble Text Will Not Be Included With Simple Simulations (.......... X X Y Cm): A lead shield of 0.762 mm thickness is utilized to form a molded, custom shield with a
Ultrasound Used Text: .
Treatment Margins In Cm: 0.5
Treatment Time / Fractionation (Optional- Include Units): .45 min
Treatment Device Design After Initial Simulation Justification (Will Render If Bill For Treatment Devices = Yes): The patient is status post radiation simulation and is evaluated as to the use of additional devices for shielding and placement for radiation therapy.
Field Size (Applicator): 2.0 cm
Dimensions-X Axis In Cm: 1.2
Dose / Tx In Cgy (Optional): 277.65
Time Dose Fractionation (Optional- Include Units If Applicable): 99
Radiation Units: cGy
Patient Positioning: Sitting
Please Choose The Type Of Visit (Required): Treatment Visit: Show Treatment Variables
Custom Shielding Afterword Text Will Not Be Included With Simple Simulations (X X Y Cm............): port to correlate with the lesion size, including treatment margin. The custom lead shield is adequate to accommodate the appropriate applicator and provide adequate shielding around the treatment site. Additional shielding (as noted below) is used to protect sensitive, normal tissues.
Port Dimensions-X Axis In Cm: 1.7
Total Number Of Fractions: 20
Use Automated Fraction Number And Cumulative Dosage?: No (Maintain Current Freetext Entry)
Cumulative Dose In Cgy (Optional): 832.95
Daily Fractionated Dose (Optional- Include Units): 277.65 cGy
Assessment: Appropriate reaction
Detail Level: Detailed
Field Size (Applicator): 2.5 cm
Energy (Include Units): 100
Total Dose (Optional-Please Include Units): 5553.00 cGy
Day Of The Week Treatment Administered: Thursday
Simple Simulation Preamble Text Will Be Included With Simple Simulations (.......... Indications): Simple simulation was performed today for the following reasons:
Depth (Optional-Please Include Units): 2.86
Information: Selecting Yes will display possible errors in your note based on the variables you have selected. This validation is only offered as a suggestion for you. PLEASE NOTE THAT THE VALIDATION TEXT WILL BE REMOVED WHEN YOU FINALIZE YOUR NOTE. IF YOU WANT TO FAX A PRELIMINARY NOTE YOU WILL NEED TO TOGGLE THIS TO 'NO' IF YOU DO NOT WANT IT IN YOUR FAXED NOTE.
Ultrasound Used Text: Ultrasound was utilized to place radiation therapy fields.
Functional Status: 0 (fully active)

## 2022-10-31 ENCOUNTER — APPOINTMENT (OUTPATIENT)
Dept: URBAN - METROPOLITAN AREA CLINIC 319 | Age: 82
Setting detail: DERMATOLOGY
End: 2022-11-01

## 2022-10-31 PROBLEM — C44.311 BASAL CELL CARCINOMA OF SKIN OF NOSE: Status: ACTIVE | Noted: 2022-10-31

## 2022-10-31 PROCEDURE — G6001 ECHO GUIDANCE RADIOTHERAPY: HCPCS | Mod: XU

## 2022-10-31 PROCEDURE — OTHER TREATMENT REGIMEN: OTHER

## 2022-10-31 PROCEDURE — OTHER FOLLOW UP FOR NEXT VISIT: OTHER

## 2022-10-31 PROCEDURE — 77280 THER RAD SIMULAJ FIELD SMPL: CPT

## 2022-10-31 PROCEDURE — 77401 RADIATION TX DELIVERY SUPFC: CPT

## 2022-10-31 PROCEDURE — OTHER SUPERFICIAL RADIATION TREATMENT: OTHER

## 2022-10-31 NOTE — PROCEDURE: SUPERFICIAL RADIATION TREATMENT
Intro Statement (Will Not Render If Left Blank): Plan: Per the request of Dr. Arias, the patient was seen today for Superficial Radiation Therapy planning requiring initial simulation \\tayler preparation for treatment of specific diseased sites. Simulation is necessary to determine the correct patient and treatment \\nportal positioning, to deliver safe and effective radiation therapy. A high-frequency ultrasound image was acquired prior to \\ntreatment today for two- dimensional evaluation of tumor volume and response to treatment throughout course of care, in \\naddition, to geometric accuracy of field placement. Physician evaluation of the ultrasound tumor depth, width, and breadth will \\nbe ongoing through the course of treatment and is deemed medically necessary ensuring efficacy of treatment. Today’s image \\nand setup were evaluated to determine the initiation of treatment with the current plan or necessary changes as appropriate. \\Eleazar appropriate custom blocking and treatment parameters were verified by the radiation therapist according to the initial \\nsimulation. Ultrasound image guidance and field placement prior to treatment delivery were performed. \\n\\nUltrasound depth is 1.40 mm \\n\\nPer Dr. Arisa, continued daily ultrasound guidance and simulation are required for field placement, measurement of tumor depth,\\nwidth and breadth, progress, and edema monitoring. \\n\\nPer the request of Dr. Arias , continuing medical physics review as per radiotherapy standard of care post every 5th fraction for the patient, including assessment of treatment parameters,  of dose delivery, and review of patient treatment \\ndocumentation in support the provider, is ordered, ensuring efficacy and continued safe delivery of radiotherapy. Included \\tayler the physics check is a review of patient setup information, all pertinent simulation and treatment photograph(s) checks, \\nprescription, dose calculation verification, daily dose charted correctly, elapsed days and treatment days correctly charted, \\ncumulative dose correct, and review of any prescription changes. Continued medical physics review post every 5th fraction of \\nradiotherapy is requested by the provider for appropriate radiotherapy management and is deemed medically necessary and \\nstandard of care. Intro Statement (Will Not Render If Left Blank): Plan: Per the request of Dr. Arias, the patient was seen today for Superficial Radiation Therapy planning requiring initial simulation \\tayler preparation for treatment of specific diseased sites. Simulation is necessary to determine the correct patient and treatment \\nportal positioning, to deliver safe and effective radiation therapy. A high-frequency ultrasound image was acquired prior to \\ntreatment today for two- dimensional evaluation of tumor volume and response to treatment throughout course of care, in \\naddition, to geometric accuracy of field placement. Physician evaluation of the ultrasound tumor depth, width, and breadth will \\nbe ongoing through the course of treatment and is deemed medically necessary ensuring efficacy of treatment. Today’s image \\nand setup were evaluated to determine the initiation of treatment with the current plan or necessary changes as appropriate. \\Eleazar appropriate custom blocking and treatment parameters were verified by the radiation therapist according to the initial \\nsimulation. Ultrasound image guidance and field placement prior to treatment delivery were performed. \\n\\nUltrasound depth is 1.40 mm \\n\\nPer Dr. Arias, continued daily ultrasound guidance and simulation are required for field placement, measurement of tumor depth,\\nwidth and breadth, progress, and edema monitoring. \\n\\nPer the request of Dr. Arias , continuing medical physics review as per radiotherapy standard of care post every 5th fraction for the patient, including assessment of treatment parameters,  of dose delivery, and review of patient treatment \\ndocumentation in support the provider, is ordered, ensuring efficacy and continued safe delivery of radiotherapy. Included \\tayler the physics check is a review of patient setup information, all pertinent simulation and treatment photograph(s) checks, \\nprescription, dose calculation verification, daily dose charted correctly, elapsed days and treatment days correctly charted, \\ncumulative dose correct, and review of any prescription changes. Continued medical physics review post every 5th fraction of \\nradiotherapy is requested by the provider for appropriate radiotherapy management and is deemed medically necessary and \\nstandard of care.

## 2022-10-31 NOTE — PROCEDURE: TREATMENT REGIMEN
Detail Level: Detailed
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 Written informed consent has been previously obtained from this patient for this treatment. This \\nconsent is documented in the patient's chart. The patient gave verbal consent to continue treatment today. \\nThe patient was treated with a specific radiation dose and setup as prescribed by the provider listed on this \\nvisit note. A Radiation Therapist performed administration of radiation under the supervision of a provider. \\nThe treatment parameters and cumulative dose are indicated above. Prior to administering the radiation, the \\npatient underwent a verification therapeutic radiology simulation-aided field setting defining relevant normal \\nand abnormal target anatomy and acquiring images with a separate and distinct diagnostic high-frequency \\nultrasound to delineate tissues and determine whether to proceed with delivery of therapeutic, in addition to \\nretrieve data necessary to develop an optimal radiation treatment process for the patient. The field \\nplacement simulation documents any change seen in overall tumor volume documented in the patient’s \\nrecord, allows the clinician to indicate any needed changes in the treatment plan and/or prescription, \\nprovides diagnostic evaluation as the basis for performing the therapeutic procedure, and clearly identifies \\nthe information needed to decide to proceed with the therapeutic procedure. This process includes \\nverification of the treatment port and proper treatment positioning. All treatment ports were \\nphotographed within electronic medical records. The patient's lead blocking along with gross tumor volume \\nand margin was confirmed. Considering superficial radiotherapy is clinical in setup, this requires the physician \\nand radiation therapist to clarify the location interest being treated against initial images, ultrasound, \\npathology, and patient anatomy. Care was taken to ensure snell treated were geometrically accurate and \\nproperly positioned using therapeutic radiology simulation-aided field setting verification per fraction. This \\nprocess is also utilized to determine if any prescription or setup changes are necessary. These steps are \\ntherefore medically necessary to ensure safe and effective administration of radiation. Ongoing therapeutic \\nradiology simulation-aided field setting verification is ordered throughout the course of therapy.\\n\\nA high-frequency ultrasound image was acquired today for a two-dimensional evaluation of the tumor \\nvolume, depth, width, breadth, review of prior response to treatment, provide geometric accuracy of field \\nplacement, and determine whether to proceed with therapeutic delivery.\\n\\nHigh frequency ultrasound depth is 2.20 mm , which is - 0.30 mm in difference from previous imaging.\\n\\nThe field placement and ultrasound imaging, per fraction, is separate and distinct from the initial simulation \\nand is an important task in providing safe administration of superficial radiation therapy. Physician evaluation \\nof the ultrasound information will be ongoing throughout the course of treatment and is deemed medically \\nnecessary to ensure the efficacy of treatment, whether to proceed with therapeutic delivery, and determine\\apollo necessary changes. Today's images were evaluated for determination of continuation of treatment with \\nthe current plan or with necessary changes as appropriate. According to the review of verification \\ntherapeutic radiology simulation-aided field setting and imaging, no change is required. \\nAdditionally, the use of ultrasound visualization and targeted assessment allows the patient to be able to see \\nher cancer progress, encouraging the patient to complete and maintain compliance through the full \\ncourse of prescribed radiotherapy. Per Dr. ROSE Arias, continued ultrasound guidance and therapeutic radiology \\nsimulation-aided field setting verification per fraction is required for field placement, measurement of tumor \\ndepth, tissues evaluation, progress, acute effect monitoring, and determination for therapeutic treatment \\ndelivery is appropriate.

## 2022-11-01 ENCOUNTER — APPOINTMENT (OUTPATIENT)
Dept: URBAN - METROPOLITAN AREA CLINIC 319 | Age: 82
Setting detail: DERMATOLOGY
End: 2022-11-03

## 2022-11-01 PROBLEM — C44.311 BASAL CELL CARCINOMA OF SKIN OF NOSE: Status: ACTIVE | Noted: 2022-11-01

## 2022-11-01 PROCEDURE — 77401 RADIATION TX DELIVERY SUPFC: CPT

## 2022-11-01 PROCEDURE — G6001 ECHO GUIDANCE RADIOTHERAPY: HCPCS | Mod: XU

## 2022-11-01 PROCEDURE — 77280 THER RAD SIMULAJ FIELD SMPL: CPT

## 2022-11-01 PROCEDURE — 77427 RADIATION TX MANAGEMENT X5: CPT

## 2022-11-01 PROCEDURE — OTHER TREATMENT REGIMEN: OTHER

## 2022-11-01 PROCEDURE — OTHER FOLLOW UP FOR NEXT VISIT: OTHER

## 2022-11-01 PROCEDURE — OTHER SUPERFICIAL RADIATION TREATMENT: OTHER

## 2022-11-01 NOTE — PROCEDURE: TREATMENT REGIMEN
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 Written informed consent has been previously obtained from this patient for this treatment. This \\nconsent is documented in the patient's chart. The patient gave verbal consent to continue treatment today. \\nThe patient was treated with a specific radiation dose and setup as prescribed by the provider listed on this \\nvisit note. A Radiation Therapist performed administration of radiation under the supervision of a provider. \\nThe treatment parameters and cumulative dose are indicated above. Prior to administering the radiation, the \\npatient underwent a verification therapeutic radiology simulation-aided field setting defining relevant normal \\nand abnormal target anatomy and acquiring images with a separate and distinct diagnostic high-frequency \\nultrasound to delineate tissues and determine whether to proceed with delivery of therapeutic, in addition to \\nretrieve data necessary to develop an optimal radiation treatment process for the patient. The field \\nplacement simulation documents any change seen in overall tumor volume documented in the patient’s \\nrecord, allows the clinician to indicate any needed changes in the treatment plan and/or prescription, \\nprovides diagnostic evaluation as the basis for performing the therapeutic procedure, and clearly identifies \\nthe information needed to decide to proceed with the therapeutic procedure. This process includes \\nverification of the treatment port and proper treatment positioning. All treatment ports were \\nphotographed within electronic medical records. The patient's lead blocking along with gross tumor volume \\nand margin was confirmed. Considering superficial radiotherapy is clinical in setup, this requires the physician \\nand radiation therapist to clarify the location interest being treated against initial images, ultrasound, \\npathology, and patient anatomy. Care was taken to ensure snell treated were geometrically accurate and \\nproperly positioned using therapeutic radiology simulation-aided field setting verification per fraction. This \\nprocess is also utilized to determine if any prescription or setup changes are necessary. These steps are \\ntherefore medically necessary to ensure safe and effective administration of radiation. Ongoing therapeutic \\nradiology simulation-aided field setting verification is ordered throughout the course of therapy.\\n\\nA high-frequency ultrasound image was acquired today for a two-dimensional evaluation of the tumor \\nvolume, depth, width, breadth, review of prior response to treatment, provide geometric accuracy of field \\nplacement, and determine whether to proceed with therapeutic delivery.\\n\\nHigh frequency ultrasound depth is 2.34 mm , which is + 0.14 mm in difference from previous imaging.\\n\\nThe field placement and ultrasound imaging, per fraction, is separate and distinct from the initial simulation \\nand is an important task in providing safe administration of superficial radiation therapy. Physician evaluation \\nof the ultrasound information will be ongoing throughout the course of treatment and is deemed medically \\nnecessary to ensure the efficacy of treatment, whether to proceed with therapeutic delivery, and determine\\apollo necessary changes. Today's images were evaluated for determination of continuation of treatment with \\nthe current plan or with necessary changes as appropriate. According to the review of verification \\ntherapeutic radiology simulation-aided field setting and imaging, no change is required. \\nAdditionally, the use of ultrasound visualization and targeted assessment allows the patient to be able to see \\nher cancer progress, encouraging the patient to complete and maintain compliance through the full \\ncourse of prescribed radiotherapy. Per Dr. ROSE Arias, continued ultrasound guidance and therapeutic radiology \\nsimulation-aided field setting verification per fraction is required for field placement, measurement of tumor \\ndepth, tissues evaluation, progress, acute effect monitoring, and determination for therapeutic treatment \\ndelivery is appropriate.
Detail Level: Detailed

## 2022-11-01 NOTE — PROCEDURE: SUPERFICIAL RADIATION TREATMENT
Detail Level: Detailed
Information: Selecting Yes will display possible errors in your note based on the variables you have selected. This validation is only offered as a suggestion for you. PLEASE NOTE THAT THE VALIDATION TEXT WILL BE REMOVED WHEN YOU FINALIZE YOUR NOTE. IF YOU WANT TO FAX A PRELIMINARY NOTE YOU WILL NEED TO TOGGLE THIS TO 'NO' IF YOU DO NOT WANT IT IN YOUR FAXED NOTE.
Show Ultrasound In Note?: Yes
Ultrasound Used Text: Ultrasound was utilized to place radiation therapy fields.
Functional Status: 0 (fully active)
Fractions / Week Rx 6: 5
Total Number Of Fractions: 20
Field Number: 1
Additional Change To Daily Dosage Administered Mid Treatment?: No
Total Number Of Fractions Rx 2: 15
Treatment Time In Min (Optional): .45
Field Size (Applicator): 2.5 cm
Fractions / Week: 3
Treatment Margins In Cm: 0.5
Initial Radiation Treatment Planning (Will Render If Bill Simulation = Yes): The patient had a complete consultation regarding all applicable modalities for the treatment of their skin cancer and based on a variety of factors including the type of tumor, size, and location, the relevant medical history as well as local tissue factors, the functional status of the individual, the ability to perform necessary postoperative wound instructions and the need for simultaneous treatments as well as overall wound healing status, it was determined that the patient would begin radiation therapy treatment for skin cancer.  A full simulation and treatment device design was performed including the determination and formulation of appropriate simple and complex devices including lead shield of 0.762 mm thickness to form molded customized shielding to specifically correlate with the lesion size including treatment margin.  The custom lead shield is adequate to accommodate the appropriate applicator and provide adequate shielding around the treatment site.  The specific field applicator, shields, and devices both simple and complex as well as the specific patient setup is outlined below.  The patient was given a full consent for superficial radiation to both verbally and in writing and the full determination of patient's eligibility for treatment and selection is outlined on the patient eligibility and treatment selection form.  The specific superficial radiotherapy prescription was determined and was documented on the superficial radiotherapy prescription form.  A treatment calculation was also performed and documented on the treatment calculation form.  Based on the prescription, the patient was scheduled for a series of fractional treatments.
Port Dimensions-Y Axis In Cm: 1.7
Energy (Optional-Please Include Units): 100 kV
Custom Shielding Preamble Text Will Not Be Included With Simple Simulations (.......... X X Y Cm): A lead shield of 0.762 mm thickness is utilized to form a molded, custom shield with a
Ultrasound Used Text: .
Dimensions-X Axis In Cm: 1.2
Patient Positioning: Sitting
Day Of The Week Treatment Administered: Tuesday
Dose / Tx In Cgy (Optional): 277.65
Simple Simulation Preamble Text Will Be Included With Simple Simulations (.......... Indications): Simple simulation was performed today for the following reasons:
Time Dose Fractionation (Optional- Include Units If Applicable): 99
Please Choose The Type Of Visit (Required): Treatment and Weekly Evaluation Visit: Show Treatment/Evaluation Variables
Depth (Optional-Please Include Units): 2.86
Intro Statement (Will Not Render If Left Blank): The patient is undergoing superficial radiation therapy for skin cancer and presents for weekly \\nevaluation and management. Per protocol and as documented on the flow sheet, the patient was questioned \\perry to subjective redness, pruritus, pain, drainage, fatigue, or any other symptoms. Objectively, the radiation \\narea was evaluated with regards to erythema, atrophy, scale, crusting, erosion, ulceration, edema, purpura, \\ntenderness, warmth, drainage, and any other findings. The plan was extensively reviewed including dose and \\ndosing schedule. The simulation and clinical setup were also reviewed as were external \\nshields and based on this review the appropriateness and sufficiency of treatment was determined.\\nA high-frequency ultrasound image was acquired today for a two-dimensional evaluation of the tumor \\nvolume, depth, width, breadth, review of prior response to treatment, provide geometric accuracy of field \\nplacement, and determine whether to proceed with therapeutic delivery.\\n\\nHigh frequency ultrasound depth is 2.34 mm, which is + 0.14 mm in difference from previous imaging.\\n\\nPer Dr. ROSE Arias, continued ultrasound guidance and therapeutic radiology simulation-aided field setting \\nverification per fraction is required for field placement, measurement of tumor depth, tissues evaluation, \\nprogress, acute effect monitoring, and determination for therapeutic treatment delivery is appropriate.\\nSubjective:\\nRedness\\nObjective:\\nErythema\\nAssessment:\\nAppropriate reaction\\nPlan:\\nContinue current treatment regimen\\nComments:
Use Automated Fraction Number And Cumulative Dosage?: No (Maintain Current Freetext Entry)
Assessment: Appropriate reaction
Daily Fractionated Dose (Optional- Include Units): 277.65 cGy
Cumulative Dose In Cgy (Optional): 1388.25
Total Dose (Optional-Please Include Units): 5553.00 cGy
Energy (Include Units): 100
Treatment Time / Fractionation (Optional- Include Units): .45 min
Field Size (Applicator): 2.0 cm
Treatment Device Design After Initial Simulation Justification (Will Render If Bill For Treatment Devices = Yes): The patient is status post radiation simulation and is evaluated as to the use of additional devices for shielding and placement for radiation therapy.
Radiation Units: cGy
Custom Shielding Afterword Text Will Not Be Included With Simple Simulations (X X Y Cm............): port to correlate with the lesion size, including treatment margin. The custom lead shield is adequate to accommodate the appropriate applicator and provide adequate shielding around the treatment site. Additional shielding (as noted below) is used to protect sensitive, normal tissues.

## 2022-11-03 ENCOUNTER — APPOINTMENT (OUTPATIENT)
Dept: URBAN - METROPOLITAN AREA CLINIC 319 | Age: 82
Setting detail: DERMATOLOGY
End: 2022-11-07

## 2022-11-03 PROBLEM — C44.311 BASAL CELL CARCINOMA OF SKIN OF NOSE: Status: ACTIVE | Noted: 2022-11-03

## 2022-11-03 PROCEDURE — OTHER TREATMENT REGIMEN: OTHER

## 2022-11-03 PROCEDURE — OTHER FOLLOW UP FOR NEXT VISIT: OTHER

## 2022-11-03 PROCEDURE — G6001 ECHO GUIDANCE RADIOTHERAPY: HCPCS | Mod: XU

## 2022-11-03 PROCEDURE — 77280 THER RAD SIMULAJ FIELD SMPL: CPT

## 2022-11-03 PROCEDURE — OTHER SUPERFICIAL RADIATION TREATMENT: OTHER

## 2022-11-03 PROCEDURE — 77401 RADIATION TX DELIVERY SUPFC: CPT

## 2022-11-03 NOTE — PROCEDURE: SUPERFICIAL RADIATION TREATMENT
Daily Fractionated Dose (Optional- Include Units): 277.65 cGy
Cumulative Dose In Cgy (Optional): 1665.90
Simple Simulation Afterword Text Will Be Included With Simple Simulations (Indications............): The patient had a complete consultation regarding all applicable modalities for the treatment of their skin cancer and based on a variety of factors including the type of tumor, size, and location, the relevant medical history as well as local tissue factors, the functional status of the individual, the ability to perform necessary postoperative wound instructions and the need for simultaneous treatments as well as overall wound healing status, it was determined that the patient would begin radiation therapy treatment for skin cancer.  A full simulation and treatment device design was performed including the determination and formulation of appropriate simple and complex devices including lead shield of 0.762 mm thickness to form molded customized shielding to specifically correlate with the lesion size including treatment margin.  The custom lead shield is adequate to accommodate the appropriate applicator and provide adequate shielding around the treatment site.  The specific field applicator, shields, and devices both simple and complex as well as the specific patient setup is outlined below.  The patient was given a full consent for superficial radiation to both verbally and in writing and the full determination of patient's eligibility for treatment and selection is outlined on the patient eligibility and treatment selection form.  The specific superficial radiotherapy prescription was determined and was documented on the superficial radiotherapy prescription form.  A treatment calculation was also performed and documented on the treatment calculation form.  Based on the prescription, the patient was scheduled for a series of fractional treatments.
Bill For Radiation Treatment: Yes
Field Number: 1
Port Dimensions-Y Axis In Cm: 1.7
Total Number Of Fractions Rx 3: 15
Fractionation Number: 6
Add Physics Consultation: No
Fractionation Number (Evaluation): 5
Field Size (Applicator): 2.5 cm
Total Dose (Optional-Please Include Units): 5553.00 cGy
Energy (Include Units): 100
Custom Shielding Preamble Text Will Not Be Included With Simple Simulations (.......... X X Y Cm): A lead shield of 0.762 mm thickness is utilized to form a molded, custom shield with a
Energy (Optional-Please Include Units): 100 kV
Treatment Margins In Cm: 0.5
Patient Positioning: Sitting
Field Size (Applicator): 2.0 cm
Information: Selecting Yes will display possible errors in your note based on the variables you have selected. This validation is only offered as a suggestion for you. PLEASE NOTE THAT THE VALIDATION TEXT WILL BE REMOVED WHEN YOU FINALIZE YOUR NOTE. IF YOU WANT TO FAX A PRELIMINARY NOTE YOU WILL NEED TO TOGGLE THIS TO 'NO' IF YOU DO NOT WANT IT IN YOUR FAXED NOTE.
Day Of The Week Treatment Administered: Thursday
Radiation Units: cGy
Depth (Optional-Please Include Units): 2.86
Custom Shielding Afterword Text Will Not Be Included With Simple Simulations (X X Y Cm............): port to correlate with the lesion size, including treatment margin. The custom lead shield is adequate to accommodate the appropriate applicator and provide adequate shielding around the treatment site. Additional shielding (as noted below) is used to protect sensitive, normal tissues.
Assessment: Appropriate reaction
Ultrasound Used Text: Ultrasound was utilized to place radiation therapy fields.
Detail Level: Detailed
Treatment Time In Min (Optional): .45
Fractions / Week: 3
Ultrasound Used Text: .
Treatment Time / Fractionation (Optional- Include Units): .45 min
Dimensions-X Axis In Cm: 1.2
Treatment Device Design After Initial Simulation Justification (Will Render If Bill For Treatment Devices = Yes): The patient is status post radiation simulation and is evaluated as to the use of additional devices for shielding and placement for radiation therapy.
Dose Per Fractionation In Cgy (Optional): 277.65
Simple Simulation Preamble Text Will Be Included With Simple Simulations (.......... Indications): Simple simulation was performed today for the following reasons:
Time Dose Fractionation (Optional- Include Units If Applicable): 99
Intro Statement (Will Not Render If Left Blank): The patient is undergoing superficial radiation therapy for skin cancer and presents for weekly \\nevaluation and management. Per protocol and as documented on the flow sheet, the patient was questioned \\perry to subjective redness, pruritus, pain, drainage, fatigue, or any other symptoms. Objectively, the radiation \\narea was evaluated with regards to erythema, atrophy, scale, crusting, erosion, ulceration, edema, purpura, \\ntenderness, warmth, drainage, and any other findings. The plan was extensively reviewed including dose and \\ndosing schedule. The simulation and clinical setup were also reviewed as were external \\nshields and based on this review the appropriateness and sufficiency of treatment was determined.\\nA high-frequency ultrasound image was acquired today for a two-dimensional evaluation of the tumor \\nvolume, depth, width, breadth, review of prior response to treatment, provide geometric accuracy of field \\nplacement, and determine whether to proceed with therapeutic delivery.\\n\\nHigh frequency ultrasound depth is 2.34 mm, which is + 0.14 mm in difference from previous imaging.\\n\\nPer Dr. ROSE Arias, continued ultrasound guidance and therapeutic radiology simulation-aided field setting \\nverification per fraction is required for field placement, measurement of tumor depth, tissues evaluation, \\nprogress, acute effect monitoring, and determination for therapeutic treatment delivery is appropriate.\\nSubjective:\\nRedness\\nObjective:\\nErythema\\nAssessment:\\nAppropriate reaction\\nPlan:\\nContinue current treatment regimen\\nComments:
Please Choose The Type Of Visit (Required): Treatment Visit: Show Treatment Variables
Use Automated Fraction Number And Cumulative Dosage?: No (Maintain Current Freetext Entry)
Functional Status: 0 (fully active)
Total Number Of Fractions: 20

## 2022-11-03 NOTE — PROCEDURE: TREATMENT REGIMEN
Detail Level: Detailed
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 Written informed consent has been previously obtained from this patient for this treatment. This \\nconsent is documented in the patient's chart. The patient gave verbal consent to continue treatment today. \\nThe patient was treated with a specific radiation dose and setup as prescribed by the provider listed on this \\nvisit note. A Radiation Therapist performed administration of radiation under the supervision of a provider. \\nThe treatment parameters and cumulative dose are indicated above. Prior to administering the radiation, the \\npatient underwent a verification therapeutic radiology simulation-aided field setting defining relevant normal \\nand abnormal target anatomy and acquiring images with a separate and distinct diagnostic high-frequency \\nultrasound to delineate tissues and determine whether to proceed with delivery of therapeutic, in addition to \\nretrieve data necessary to develop an optimal radiation treatment process for the patient. The field \\nplacement simulation documents any change seen in overall tumor volume documented in the patient’s \\nrecord, allows the clinician to indicate any needed changes in the treatment plan and/or prescription, \\nprovides diagnostic evaluation as the basis for performing the therapeutic procedure, and clearly identifies \\nthe information needed to decide to proceed with the therapeutic procedure. This process includes \\nverification of the treatment port and proper treatment positioning. All treatment ports were \\nphotographed within electronic medical records. The patient's lead blocking along with gross tumor volume \\nand margin was confirmed. Considering superficial radiotherapy is clinical in setup, this requires the physician \\nand radiation therapist to clarify the location interest being treated against initial images, ultrasound, \\npathology, and patient anatomy. Care was taken to ensure snell treated were geometrically accurate and \\nproperly positioned using therapeutic radiology simulation-aided field setting verification per fraction. This \\nprocess is also utilized to determine if any prescription or setup changes are necessary. These steps are \\ntherefore medically necessary to ensure safe and effective administration of radiation. Ongoing therapeutic \\nradiology simulation-aided field setting verification is ordered throughout the course of therapy.\\n\\nA high-frequency ultrasound image was acquired today for a two-dimensional evaluation of the tumor \\nvolume, depth, width, breadth, review of prior response to treatment, provide geometric accuracy of field \\nplacement, and determine whether to proceed with therapeutic delivery.\\n\\nHigh frequency ultrasound depth is 2.25 mm , which is - 0.09 mm in difference from previous imaging.\\n\\nThe field placement and ultrasound imaging, per fraction, is separate and distinct from the initial simulation \\nand is an important task in providing safe administration of superficial radiation therapy. Physician evaluation \\nof the ultrasound information will be ongoing throughout the course of treatment and is deemed medically \\nnecessary to ensure the efficacy of treatment, whether to proceed with therapeutic delivery, and determine\\apollo necessary changes. Today's images were evaluated for determination of continuation of treatment with \\nthe current plan or with necessary changes as appropriate. According to the review of verification \\ntherapeutic radiology simulation-aided field setting and imaging, no change is required. \\nAdditionally, the use of ultrasound visualization and targeted assessment allows the patient to be able to see \\nher cancer progress, encouraging the patient to complete and maintain compliance through the full \\ncourse of prescribed radiotherapy. Per Dr. ROSE Arias, continued ultrasound guidance and therapeutic radiology \\nsimulation-aided field setting verification per fraction is required for field placement, measurement of tumor \\ndepth, tissues evaluation, progress, acute effect monitoring, and determination for therapeutic treatment \\ndelivery is appropriate.

## 2022-11-05 ENCOUNTER — APPOINTMENT (OUTPATIENT)
Dept: URBAN - METROPOLITAN AREA CLINIC 319 | Age: 82
Setting detail: DERMATOLOGY
End: 2022-12-30

## 2022-11-05 PROCEDURE — 77336 RADIATION PHYSICS CONSULT: CPT

## 2022-11-07 ENCOUNTER — APPOINTMENT (OUTPATIENT)
Dept: URBAN - METROPOLITAN AREA CLINIC 319 | Age: 82
Setting detail: DERMATOLOGY
End: 2022-11-09

## 2022-11-07 PROBLEM — C44.311 BASAL CELL CARCINOMA OF SKIN OF NOSE: Status: ACTIVE | Noted: 2022-11-07

## 2022-11-07 PROCEDURE — OTHER SUPERFICIAL RADIATION TREATMENT: OTHER

## 2022-11-07 PROCEDURE — OTHER FOLLOW UP FOR NEXT VISIT: OTHER

## 2022-11-07 PROCEDURE — 77401 RADIATION TX DELIVERY SUPFC: CPT

## 2022-11-07 PROCEDURE — 77280 THER RAD SIMULAJ FIELD SMPL: CPT

## 2022-11-07 PROCEDURE — OTHER TREATMENT REGIMEN: OTHER

## 2022-11-07 PROCEDURE — G6001 ECHO GUIDANCE RADIOTHERAPY: HCPCS | Mod: XU

## 2022-11-07 NOTE — PROCEDURE: SUPERFICIAL RADIATION TREATMENT
Field Number: 1
Port Dimensions-Y Axis In Cm: 1.7
Total Number Of Fractions Rx 3: 15
Render Additional Prescriptions In Note?: No
Fractionation Number: 7
Initial Radiation Treatment Planning (Will Render If Bill Simulation = Yes): The patient had a complete consultation regarding all applicable modalities for the treatment of their skin cancer and based on a variety of factors including the type of tumor, size, and location, the relevant medical history as well as local tissue factors, the functional status of the individual, the ability to perform necessary postoperative wound instructions and the need for simultaneous treatments as well as overall wound healing status, it was determined that the patient would begin radiation therapy treatment for skin cancer.  A full simulation and treatment device design was performed including the determination and formulation of appropriate simple and complex devices including lead shield of 0.762 mm thickness to form molded customized shielding to specifically correlate with the lesion size including treatment margin.  The custom lead shield is adequate to accommodate the appropriate applicator and provide adequate shielding around the treatment site.  The specific field applicator, shields, and devices both simple and complex as well as the specific patient setup is outlined below.  The patient was given a full consent for superficial radiation to both verbally and in writing and the full determination of patient's eligibility for treatment and selection is outlined on the patient eligibility and treatment selection form.  The specific superficial radiotherapy prescription was determined and was documented on the superficial radiotherapy prescription form.  A treatment calculation was also performed and documented on the treatment calculation form.  Based on the prescription, the patient was scheduled for a series of fractional treatments.
Field Size (Applicator): 2.5 cm
Fractionation Number (Evaluation): 5
Energy (Include Units): 100
Energy (Optional-Please Include Units): 100 kV
Total Dose (Optional-Please Include Units): 5553.00 cGy
Custom Shielding Preamble Text Will Not Be Included With Simple Simulations (.......... X X Y Cm): A lead shield of 0.762 mm thickness is utilized to form a molded, custom shield with a
Patient Positioning: Sitting
Radiation Units: cGy
Information: Selecting Yes will display possible errors in your note based on the variables you have selected. This validation is only offered as a suggestion for you. PLEASE NOTE THAT THE VALIDATION TEXT WILL BE REMOVED WHEN YOU FINALIZE YOUR NOTE. IF YOU WANT TO FAX A PRELIMINARY NOTE YOU WILL NEED TO TOGGLE THIS TO 'NO' IF YOU DO NOT WANT IT IN YOUR FAXED NOTE.
Treatment Device Design After Initial Simulation Justification (Will Render If Bill For Treatment Devices = Yes): The patient is status post radiation simulation and is evaluated as to the use of additional devices for shielding and placement for radiation therapy.
Field Size (Applicator): 2.0 cm
Intro Statement (Will Not Render If Left Blank): The patient is undergoing superficial radiation therapy for skin cancer and presents for weekly \\nevaluation and management. Per protocol and as documented on the flow sheet, the patient was questioned \\perry to subjective redness, pruritus, pain, drainage, fatigue, or any other symptoms. Objectively, the radiation \\narea was evaluated with regards to erythema, atrophy, scale, crusting, erosion, ulceration, edema, purpura, \\ntenderness, warmth, drainage, and any other findings. The plan was extensively reviewed including dose and \\ndosing schedule. The simulation and clinical setup were also reviewed as were external \\nshields and based on this review the appropriateness and sufficiency of treatment was determined.\\nA high-frequency ultrasound image was acquired today for a two-dimensional evaluation of the tumor \\nvolume, depth, width, breadth, review of prior response to treatment, provide geometric accuracy of field \\nplacement, and determine whether to proceed with therapeutic delivery.\\n\\nHigh frequency ultrasound depth is 2.34 mm, which is + 0.14 mm in difference from previous imaging.\\n\\nPer Dr. ROSE Arias, continued ultrasound guidance and therapeutic radiology simulation-aided field setting \\nverification per fraction is required for field placement, measurement of tumor depth, tissues evaluation, \\nprogress, acute effect monitoring, and determination for therapeutic treatment delivery is appropriate.\\nSubjective:\\nRedness\\nObjective:\\nErythema\\nAssessment:\\nAppropriate reaction\\nPlan:\\nContinue current treatment regimen\\nComments:
Simple Simulation Preamble Text Will Be Included With Simple Simulations (.......... Indications): Simple simulation was performed today for the following reasons:
Depth (Optional-Please Include Units): 2.86
Validate Note Data (See Information Below): Yes
Ultrasound Used Text: Ultrasound was utilized to place radiation therapy fields.
Detail Level: Detailed
Dimensions-Y Axis In Cm: 1.2
Cumulative Dose In Cgy (Optional): 1943.55
Daily Fractionated Dose (Optional- Include Units): 277.65 cGy
Fractions / Week: 3
Treatment Time In Min (Optional): .45
Ultrasound Used Text: .
Treatment Margins In Cm: 0.5
Treatment Time / Fractionation (Optional- Include Units): .45 min
Day Of The Week Treatment Administered: Monday
Dose / Tx In Cgy (Optional): 277.65
Time Dose Fractionation (Optional- Include Units If Applicable): 99
Assessment: Appropriate reaction
Please Choose The Type Of Visit (Required): Treatment Visit: Show Treatment Variables
Custom Shielding Afterword Text Will Not Be Included With Simple Simulations (X X Y Cm............): port to correlate with the lesion size, including treatment margin. The custom lead shield is adequate to accommodate the appropriate applicator and provide adequate shielding around the treatment site. Additional shielding (as noted below) is used to protect sensitive, normal tissues.
Functional Status: 0 (fully active)
Total Number Of Fractions: 20
Use Automated Fraction Number And Cumulative Dosage?: No (Maintain Current Freetext Entry)

## 2022-11-07 NOTE — PROCEDURE: TREATMENT REGIMEN
Detail Level: Detailed
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 Written informed consent has been previously obtained from this patient for this treatment. This \\nconsent is documented in the patient's chart. The patient gave verbal consent to continue treatment today. \\nThe patient was treated with a specific radiation dose and setup as prescribed by the provider listed on this \\nvisit note. A Radiation Therapist performed administration of radiation under the supervision of a provider. \\nThe treatment parameters and cumulative dose are indicated above. Prior to administering the radiation, the \\npatient underwent a verification therapeutic radiology simulation-aided field setting defining relevant normal \\nand abnormal target anatomy and acquiring images with a separate and distinct diagnostic high-frequency \\nultrasound to delineate tissues and determine whether to proceed with delivery of therapeutic, in addition to \\nretrieve data necessary to develop an optimal radiation treatment process for the patient. The field \\nplacement simulation documents any change seen in overall tumor volume documented in the patient’s \\nrecord, allows the clinician to indicate any needed changes in the treatment plan and/or prescription, \\nprovides diagnostic evaluation as the basis for performing the therapeutic procedure, and clearly identifies \\nthe information needed to decide to proceed with the therapeutic procedure. This process includes \\nverification of the treatment port and proper treatment positioning. All treatment ports were \\nphotographed within electronic medical records. The patient's lead blocking along with gross tumor volume \\nand margin was confirmed. Considering superficial radiotherapy is clinical in setup, this requires the physician \\nand radiation therapist to clarify the location interest being treated against initial images, ultrasound, \\npathology, and patient anatomy. Care was taken to ensure snell treated were geometrically accurate and \\nproperly positioned using therapeutic radiology simulation-aided field setting verification per fraction. This \\nprocess is also utilized to determine if any prescription or setup changes are necessary. These steps are \\ntherefore medically necessary to ensure safe and effective administration of radiation. Ongoing therapeutic \\nradiology simulation-aided field setting verification is ordered throughout the course of therapy.\\n\\nA high-frequency ultrasound image was acquired today for a two-dimensional evaluation of the tumor \\nvolume, depth, width, breadth, review of prior response to treatment, provide geometric accuracy of field \\nplacement, and determine whether to proceed with therapeutic delivery.\\n\\nHigh frequency ultrasound depth is 2.76  mm , which is + 0.51 mm in difference from previous imaging.\\n\\nThe field placement and ultrasound imaging, per fraction, is separate and distinct from the initial simulation \\nand is an important task in providing safe administration of superficial radiation therapy. Physician evaluation \\nof the ultrasound information will be ongoing throughout the course of treatment and is deemed medically \\nnecessary to ensure the efficacy of treatment, whether to proceed with therapeutic delivery, and determine\\apollo necessary changes. Today's images were evaluated for determination of continuation of treatment with \\nthe current plan or with necessary changes as appropriate. According to the review of verification \\ntherapeutic radiology simulation-aided field setting and imaging, no change is required. \\nAdditionally, the use of ultrasound visualization and targeted assessment allows the patient to be able to see \\nher cancer progress, encouraging the patient to complete and maintain compliance through the full \\ncourse of prescribed radiotherapy. Per Dr. ROSE Arias, continued ultrasound guidance and therapeutic radiology \\nsimulation-aided field setting verification per fraction is required for field placement, measurement of tumor \\ndepth, tissues evaluation, progress, acute effect monitoring, and determination for therapeutic treatment \\ndelivery is appropriate.

## 2022-11-08 ENCOUNTER — APPOINTMENT (OUTPATIENT)
Dept: URBAN - METROPOLITAN AREA CLINIC 319 | Age: 82
Setting detail: DERMATOLOGY
End: 2022-11-11

## 2022-11-08 PROBLEM — C44.311 BASAL CELL CARCINOMA OF SKIN OF NOSE: Status: ACTIVE | Noted: 2022-11-08

## 2022-11-08 PROCEDURE — 77401 RADIATION TX DELIVERY SUPFC: CPT

## 2022-11-08 PROCEDURE — OTHER TREATMENT REGIMEN: OTHER

## 2022-11-08 PROCEDURE — OTHER FOLLOW UP FOR NEXT VISIT: OTHER

## 2022-11-08 PROCEDURE — G6001 ECHO GUIDANCE RADIOTHERAPY: HCPCS | Mod: XU

## 2022-11-08 PROCEDURE — OTHER SUPERFICIAL RADIATION TREATMENT: OTHER

## 2022-11-08 PROCEDURE — 77280 THER RAD SIMULAJ FIELD SMPL: CPT

## 2022-11-08 NOTE — PROCEDURE: SUPERFICIAL RADIATION TREATMENT
Total Number Of Fractions Rx 2: 15
Show Ultrasound In Note?: Yes
Bill For Treatment Devices Only: No
Ultrasound Used Text: Ultrasound was utilized to place radiation therapy fields.
Fractions / Week Rx 6: 5
Dimensions-Y Axis In Cm: 1.2
Cumulative Dose In Cgy (Optional): 2221.20
Daily Fractionated Dose (Optional- Include Units): 277.65 cGy
Port Dimensions-Y Axis In Cm: 1.7
Fractionation Number: 8
Number Of Treatment Days: 1
Initial Radiation Treatment Planning (Will Render If Bill Simulation = Yes): The patient had a complete consultation regarding all applicable modalities for the treatment of their skin cancer and based on a variety of factors including the type of tumor, size, and location, the relevant medical history as well as local tissue factors, the functional status of the individual, the ability to perform necessary postoperative wound instructions and the need for simultaneous treatments as well as overall wound healing status, it was determined that the patient would begin radiation therapy treatment for skin cancer.  A full simulation and treatment device design was performed including the determination and formulation of appropriate simple and complex devices including lead shield of 0.762 mm thickness to form molded customized shielding to specifically correlate with the lesion size including treatment margin.  The custom lead shield is adequate to accommodate the appropriate applicator and provide adequate shielding around the treatment site.  The specific field applicator, shields, and devices both simple and complex as well as the specific patient setup is outlined below.  The patient was given a full consent for superficial radiation to both verbally and in writing and the full determination of patient's eligibility for treatment and selection is outlined on the patient eligibility and treatment selection form.  The specific superficial radiotherapy prescription was determined and was documented on the superficial radiotherapy prescription form.  A treatment calculation was also performed and documented on the treatment calculation form.  Based on the prescription, the patient was scheduled for a series of fractional treatments.
Field Size (Applicator): 2.5 cm
Energy (Include Units): 100
Energy (Optional-Please Include Units): 100 kV
Total Dose (Optional-Please Include Units): 5553.00 cGy
Custom Shielding Preamble Text Will Not Be Included With Simple Simulations (.......... X X Y Cm): A lead shield of 0.762 mm thickness is utilized to form a molded, custom shield with a
Ultrasound Used Text: .
Treatment Margins In Cm: 0.5
Treatment Time / Fractionation (Optional- Include Units): .45 min
Computed Treatment Time In Min (Will Render The Same As Calculated Treatment Time If Left Blank): .45
Day Of The Week Treatment Administered: Tuesday
Intro Statement (Will Not Render If Left Blank): The patient is undergoing superficial radiation therapy for skin cancer and presents for weekly \\nevaluation and management. Per protocol and as documented on the flow sheet, the patient was questioned \\perry to subjective redness, pruritus, pain, drainage, fatigue, or any other symptoms. Objectively, the radiation \\narea was evaluated with regards to erythema, atrophy, scale, crusting, erosion, ulceration, edema, purpura, \\ntenderness, warmth, drainage, and any other findings. The plan was extensively reviewed including dose and \\ndosing schedule. The simulation and clinical setup were also reviewed as were external \\nshields and based on this review the appropriateness and sufficiency of treatment was determined.\\nA high-frequency ultrasound image was acquired today for a two-dimensional evaluation of the tumor \\nvolume, depth, width, breadth, review of prior response to treatment, provide geometric accuracy of field \\nplacement, and determine whether to proceed with therapeutic delivery.\\n\\nHigh frequency ultrasound depth is 2.34 mm, which is + 0.14 mm in difference from previous imaging.\\n\\nPer Dr. ROSE Arias, continued ultrasound guidance and therapeutic radiology simulation-aided field setting \\nverification per fraction is required for field placement, measurement of tumor depth, tissues evaluation, \\nprogress, acute effect monitoring, and determination for therapeutic treatment delivery is appropriate.\\nSubjective:\\nRedness\\nObjective:\\nErythema\\nAssessment:\\nAppropriate reaction\\nPlan:\\nContinue current treatment regimen\\nComments:
Simple Simulation Preamble Text Will Be Included With Simple Simulations (.......... Indications): Simple simulation was performed today for the following reasons:
Functional Status: 0 (fully active)
Depth (Optional-Please Include Units): 2.86
Total Number Of Fractions: 20
Use Automated Fraction Number And Cumulative Dosage?: No (Maintain Current Freetext Entry)
Detail Level: Detailed
Fractions / Week: 3
Radiation Units: cGy
Patient Positioning: Sitting
Information: Selecting Yes will display possible errors in your note based on the variables you have selected. This validation is only offered as a suggestion for you. PLEASE NOTE THAT THE VALIDATION TEXT WILL BE REMOVED WHEN YOU FINALIZE YOUR NOTE. IF YOU WANT TO FAX A PRELIMINARY NOTE YOU WILL NEED TO TOGGLE THIS TO 'NO' IF YOU DO NOT WANT IT IN YOUR FAXED NOTE.
Treatment Device Design After Initial Simulation Justification (Will Render If Bill For Treatment Devices = Yes): The patient is status post radiation simulation and is evaluated as to the use of additional devices for shielding and placement for radiation therapy.
Field Size (Applicator): 2.0 cm
Dose / Tx In Cgy (Optional): 277.65
Time Dose Fractionation (Optional- Include Units If Applicable): 99
Assessment: Appropriate reaction
Please Choose The Type Of Visit (Required): Treatment Visit: Show Treatment Variables
Custom Shielding Afterword Text Will Not Be Included With Simple Simulations (X X Y Cm............): port to correlate with the lesion size, including treatment margin. The custom lead shield is adequate to accommodate the appropriate applicator and provide adequate shielding around the treatment site. Additional shielding (as noted below) is used to protect sensitive, normal tissues.

## 2022-11-08 NOTE — PROCEDURE: TREATMENT REGIMEN
Detail Level: Detailed
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 Written informed consent has been previously obtained from this patient for this treatment. This \\nconsent is documented in the patient's chart. The patient gave verbal consent to continue treatment today. \\nThe patient was treated with a specific radiation dose and setup as prescribed by the provider listed on this \\nvisit note. A Radiation Therapist performed administration of radiation under the supervision of a provider. \\nThe treatment parameters and cumulative dose are indicated above. Prior to administering the radiation, the \\npatient underwent a verification therapeutic radiology simulation-aided field setting defining relevant normal \\nand abnormal target anatomy and acquiring images with a separate and distinct diagnostic high-frequency \\nultrasound to delineate tissues and determine whether to proceed with delivery of therapeutic, in addition to \\nretrieve data necessary to develop an optimal radiation treatment process for the patient. The field \\nplacement simulation documents any change seen in overall tumor volume documented in the patient’s \\nrecord, allows the clinician to indicate any needed changes in the treatment plan and/or prescription, \\nprovides diagnostic evaluation as the basis for performing the therapeutic procedure, and clearly identifies \\nthe information needed to decide to proceed with the therapeutic procedure. This process includes \\nverification of the treatment port and proper treatment positioning. All treatment ports were \\nphotographed within electronic medical records. The patient's lead blocking along with gross tumor volume \\nand margin was confirmed. Considering superficial radiotherapy is clinical in setup, this requires the physician \\nand radiation therapist to clarify the location interest being treated against initial images, ultrasound, \\npathology, and patient anatomy. Care was taken to ensure snell treated were geometrically accurate and \\nproperly positioned using therapeutic radiology simulation-aided field setting verification per fraction. This \\nprocess is also utilized to determine if any prescription or setup changes are necessary. These steps are \\ntherefore medically necessary to ensure safe and effective administration of radiation. Ongoing therapeutic \\nradiology simulation-aided field setting verification is ordered throughout the course of therapy.\\n\\nA high-frequency ultrasound image was acquired today for a two-dimensional evaluation of the tumor \\nvolume, depth, width, breadth, review of prior response to treatment, provide geometric accuracy of field \\nplacement, and determine whether to proceed with therapeutic delivery.\\n\\nHigh frequency ultrasound depth is 1.96  mm , which is - 0.80 mm in difference from previous imaging.\\n\\nThe field placement and ultrasound imaging, per fraction, is separate and distinct from the initial simulation \\nand is an important task in providing safe administration of superficial radiation therapy. Physician evaluation \\nof the ultrasound information will be ongoing throughout the course of treatment and is deemed medically \\nnecessary to ensure the efficacy of treatment, whether to proceed with therapeutic delivery, and determine\\apollo necessary changes. Today's images were evaluated for determination of continuation of treatment with \\nthe current plan or with necessary changes as appropriate. According to the review of verification \\ntherapeutic radiology simulation-aided field setting and imaging, no change is required. \\nAdditionally, the use of ultrasound visualization and targeted assessment allows the patient to be able to see \\nher cancer progress, encouraging the patient to complete and maintain compliance through the full \\ncourse of prescribed radiotherapy. Per Dr. ROSE Arias, continued ultrasound guidance and therapeutic radiology \\nsimulation-aided field setting verification per fraction is required for field placement, measurement of tumor \\ndepth, tissues evaluation, progress, acute effect monitoring, and determination for therapeutic treatment \\ndelivery is appropriate.

## 2022-11-10 ENCOUNTER — APPOINTMENT (OUTPATIENT)
Dept: URBAN - METROPOLITAN AREA CLINIC 319 | Age: 82
Setting detail: DERMATOLOGY
End: 2022-11-11

## 2022-11-10 PROBLEM — C44.311 BASAL CELL CARCINOMA OF SKIN OF NOSE: Status: ACTIVE | Noted: 2022-11-10

## 2022-11-10 PROCEDURE — 77401 RADIATION TX DELIVERY SUPFC: CPT

## 2022-11-10 PROCEDURE — 77280 THER RAD SIMULAJ FIELD SMPL: CPT

## 2022-11-10 PROCEDURE — OTHER FOLLOW UP FOR NEXT VISIT: OTHER

## 2022-11-10 PROCEDURE — G6001 ECHO GUIDANCE RADIOTHERAPY: HCPCS | Mod: XU

## 2022-11-10 PROCEDURE — OTHER TREATMENT REGIMEN: OTHER

## 2022-11-10 PROCEDURE — OTHER SUPERFICIAL RADIATION TREATMENT: OTHER

## 2022-11-10 NOTE — PROCEDURE: TREATMENT REGIMEN
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 Written informed consent has been previously obtained from this patient for this treatment. This \\nconsent is documented in the patient's chart. The patient gave verbal consent to continue treatment today. \\nThe patient was treated with a specific radiation dose and setup as prescribed by the provider listed on this \\nvisit note. A Radiation Therapist performed administration of radiation under the supervision of a provider. \\nThe treatment parameters and cumulative dose are indicated above. Prior to administering the radiation, the \\npatient underwent a verification therapeutic radiology simulation-aided field setting defining relevant normal \\nand abnormal target anatomy and acquiring images with a separate and distinct diagnostic high-frequency \\nultrasound to delineate tissues and determine whether to proceed with delivery of therapeutic, in addition to \\nretrieve data necessary to develop an optimal radiation treatment process for the patient. The field \\nplacement simulation documents any change seen in overall tumor volume documented in the patient’s \\nrecord, allows the clinician to indicate any needed changes in the treatment plan and/or prescription, \\nprovides diagnostic evaluation as the basis for performing the therapeutic procedure, and clearly identifies \\nthe information needed to decide to proceed with the therapeutic procedure. This process includes \\nverification of the treatment port and proper treatment positioning. All treatment ports were \\nphotographed within electronic medical records. The patient's lead blocking along with gross tumor volume \\nand margin was confirmed. Considering superficial radiotherapy is clinical in setup, this requires the physician \\nand radiation therapist to clarify the location interest being treated against initial images, ultrasound, \\npathology, and patient anatomy. Care was taken to ensure snell treated were geometrically accurate and \\nproperly positioned using therapeutic radiology simulation-aided field setting verification per fraction. This \\nprocess is also utilized to determine if any prescription or setup changes are necessary. These steps are \\ntherefore medically necessary to ensure safe and effective administration of radiation. Ongoing therapeutic \\nradiology simulation-aided field setting verification is ordered throughout the course of therapy.\\n\\nA high-frequency ultrasound image was acquired today for a two-dimensional evaluation of the tumor \\nvolume, depth, width, breadth, review of prior response to treatment, provide geometric accuracy of field \\nplacement, and determine whether to proceed with therapeutic delivery.\\n\\nHigh frequency ultrasound depth is 1.93  mm , which is - 0.03 mm in difference from previous imaging.\\n\\nThe field placement and ultrasound imaging, per fraction, is separate and distinct from the initial simulation \\nand is an important task in providing safe administration of superficial radiation therapy. Physician evaluation \\nof the ultrasound information will be ongoing throughout the course of treatment and is deemed medically \\nnecessary to ensure the efficacy of treatment, whether to proceed with therapeutic delivery, and determine\\apollo necessary changes. Today's images were evaluated for determination of continuation of treatment with \\nthe current plan or with necessary changes as appropriate. According to the review of verification \\ntherapeutic radiology simulation-aided field setting and imaging, no change is required. \\nAdditionally, the use of ultrasound visualization and targeted assessment allows the patient to be able to see \\nher cancer progress, encouraging the patient to complete and maintain compliance through the full \\ncourse of prescribed radiotherapy. Per Dr. ROSE Arias, continued ultrasound guidance and therapeutic radiology \\nsimulation-aided field setting verification per fraction is required for field placement, measurement of tumor \\ndepth, tissues evaluation, progress, acute effect monitoring, and determination for therapeutic treatment \\ndelivery is appropriate.
Detail Level: Detailed

## 2022-11-15 ENCOUNTER — APPOINTMENT (OUTPATIENT)
Dept: URBAN - METROPOLITAN AREA CLINIC 319 | Age: 82
Setting detail: DERMATOLOGY
End: 2022-11-16

## 2022-11-15 ENCOUNTER — APPOINTMENT (OUTPATIENT)
Dept: URBAN - METROPOLITAN AREA CLINIC 319 | Age: 82
Setting detail: DERMATOLOGY
End: 2022-11-21

## 2022-11-15 PROBLEM — C44.311 BASAL CELL CARCINOMA OF SKIN OF NOSE: Status: ACTIVE | Noted: 2022-11-15

## 2022-11-15 PROCEDURE — G6001 ECHO GUIDANCE RADIOTHERAPY: HCPCS | Mod: XU

## 2022-11-15 PROCEDURE — 77280 THER RAD SIMULAJ FIELD SMPL: CPT

## 2022-11-15 PROCEDURE — 77401 RADIATION TX DELIVERY SUPFC: CPT

## 2022-11-15 PROCEDURE — OTHER FOLLOW UP FOR NEXT VISIT: OTHER

## 2022-11-15 PROCEDURE — OTHER SUPERFICIAL RADIATION TREATMENT: OTHER

## 2022-11-15 PROCEDURE — OTHER TREATMENT REGIMEN: OTHER

## 2022-11-15 PROCEDURE — 77427 RADIATION TX MANAGEMENT X5: CPT

## 2022-11-15 NOTE — PROCEDURE: SUPERFICIAL RADIATION TREATMENT
Render Text From Evaluation And Management Tab (Will Not Bill 46540): No
Fractionation Number: 10
Simple Simulation Afterword Text Will Be Included With Simple Simulations (Indications............): The patient had a complete consultation regarding all applicable modalities for the treatment of their skin cancer and based on a variety of factors including the type of tumor, size, and location, the relevant medical history as well as local tissue factors, the functional status of the individual, the ability to perform necessary postoperative wound instructions and the need for simultaneous treatments as well as overall wound healing status, it was determined that the patient would begin radiation therapy treatment for skin cancer.  A full simulation and treatment device design was performed including the determination and formulation of appropriate simple and complex devices including lead shield of 0.762 mm thickness to form molded customized shielding to specifically correlate with the lesion size including treatment margin.  The custom lead shield is adequate to accommodate the appropriate applicator and provide adequate shielding around the treatment site.  The specific field applicator, shields, and devices both simple and complex as well as the specific patient setup is outlined below.  The patient was given a full consent for superficial radiation to both verbally and in writing and the full determination of patient's eligibility for treatment and selection is outlined on the patient eligibility and treatment selection form.  The specific superficial radiotherapy prescription was determined and was documented on the superficial radiotherapy prescription form.  A treatment calculation was also performed and documented on the treatment calculation form.  Based on the prescription, the patient was scheduled for a series of fractional treatments.
Energy (Optional-Please Include Units): 100 kV
Total Dose (Optional-Please Include Units): 5553.00 cGy
Energy (Include Units): 100
Port Dimensions-Y Axis In Cm: 1.7
Total Number Of Fractions Rx 4: 15
Bill For Ultrasound Evaluation (): Yes
Custom Shielding Preamble Text Will Not Be Included With Simple Simulations (.......... X X Y Cm): A lead shield of 0.762 mm thickness is utilized to form a molded, custom shield with a
Ultrasound Used Text: .
Dose / Tx In Cgy (Optional): 277.65
Dimensions-X Axis In Cm: 1.2
Computed Treatment Time In Min (Will Render The Same As Calculated Treatment Time If Left Blank): .45
Time Dose Fractionation (Optional- Include Units If Applicable): 99
Patient Positioning: Sitting
Field Size (Applicator): 2.5 cm
Treatment Device Design After Initial Simulation Justification (Will Render If Bill For Treatment Devices = Yes): The patient is status post radiation simulation and is evaluated as to the use of additional devices for shielding and placement for radiation therapy.
Depth (Optional-Please Include Units): 2.86
Number Of Days Off Treatment: 1
Radiation Units: cGy
Custom Shielding Afterword Text Will Not Be Included With Simple Simulations (X X Y Cm............): port to correlate with the lesion size, including treatment margin. The custom lead shield is adequate to accommodate the appropriate applicator and provide adequate shielding around the treatment site. Additional shielding (as noted below) is used to protect sensitive, normal tissues.
Daily Fractionated Dose (Optional- Include Units): 277.65 cGy
Cumulative Dose In Cgy (Optional): 2776.50
Use Automated Fraction Number And Cumulative Dosage?: No (Maintain Current Freetext Entry)
Fractions / Week Rx 2: 5
Treatment Margins In Cm: 0.5
Treatment Time / Fractionation (Optional- Include Units): .45 min
Day Of The Week Treatment Administered: Monday
Field Size (Applicator): 2.0 cm
Assessment: Appropriate reaction
Information: Selecting Yes will display possible errors in your note based on the variables you have selected. This validation is only offered as a suggestion for you. PLEASE NOTE THAT THE VALIDATION TEXT WILL BE REMOVED WHEN YOU FINALIZE YOUR NOTE. IF YOU WANT TO FAX A PRELIMINARY NOTE YOU WILL NEED TO TOGGLE THIS TO 'NO' IF YOU DO NOT WANT IT IN YOUR FAXED NOTE.
Simple Simulation Preamble Text Will Be Included With Simple Simulations (.......... Indications): Simple simulation was performed today for the following reasons:
Please Choose The Type Of Visit (Required): Treatment and Weekly Evaluation Visit: Show Treatment/Evaluation Variables
Intro Statement (Will Not Render If Left Blank): The patient is undergoing superficial radiation therapy for skin cancer and presents for weekly \\nevaluation and management. Per protocol and as documented on the flow sheet, the patient was questioned \\perry to subjective redness, pruritus, pain, drainage, fatigue, or any other symptoms. Objectively, the radiation \\narea was evaluated with regards to erythema, atrophy, scale, crusting, erosion, ulceration, edema, purpura, \\ntenderness, warmth, drainage, and any other findings. The plan was extensively reviewed including dose and \\ndosing schedule. The simulation and clinical setup were also reviewed as were external \\nshields and based on this review the appropriateness and sufficiency of treatment was determined.\\nA high-frequency ultrasound image was acquired today for a two-dimensional evaluation of the tumor \\nvolume, depth, width, breadth, review of prior response to treatment, provide geometric accuracy of field \\nplacement, and determine whether to proceed with therapeutic delivery.\\n\\nHigh frequency ultrasound depth is 1.94 mm, which is + 01 mm in difference from previous imaging.\\n\\nPer Dr. ROSE Arias, continued ultrasound guidance and therapeutic radiology simulation-aided field setting \\nverification per fraction is required for field placement, measurement of tumor depth, tissues evaluation, \\nprogress, acute effect monitoring, and determination for therapeutic treatment delivery is appropriate.\\nSubjective:\\nRedness\\nObjective:\\nErythema\\nAssessment:\\nAppropriate reaction\\nPlan:\\nContinue current treatment regimen\\n
Functional Status: 0 (fully active)
Total Number Of Fractions: 20
Detail Level: Detailed
Ultrasound Used Text: Ultrasound was utilized to place radiation therapy fields.
Fractions / Week: 3

## 2022-11-15 NOTE — PROCEDURE: TREATMENT REGIMEN
Detail Level: Detailed
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 Written informed consent has been previously obtained from this patient for this treatment. This \\nconsent is documented in the patient's chart. The patient gave verbal consent to continue treatment today. \\nThe patient was treated with a specific radiation dose and setup as prescribed by the provider listed on this \\nvisit note. A Radiation Therapist performed administration of radiation under the supervision of a provider. \\nThe treatment parameters and cumulative dose are indicated above. Prior to administering the radiation, the \\npatient underwent a verification therapeutic radiology simulation-aided field setting defining relevant normal \\nand abnormal target anatomy and acquiring images with a separate and distinct diagnostic high-frequency \\nultrasound to delineate tissues and determine whether to proceed with delivery of therapeutic, in addition to \\nretrieve data necessary to develop an optimal radiation treatment process for the patient. The field \\nplacement simulation documents any change seen in overall tumor volume documented in the patient’s \\nrecord, allows the clinician to indicate any needed changes in the treatment plan and/or prescription, \\nprovides diagnostic evaluation as the basis for performing the therapeutic procedure, and clearly identifies \\nthe information needed to decide to proceed with the therapeutic procedure. This process includes \\nverification of the treatment port and proper treatment positioning. All treatment ports were \\nphotographed within electronic medical records. The patient's lead blocking along with gross tumor volume \\nand margin was confirmed. Considering superficial radiotherapy is clinical in setup, this requires the physician \\nand radiation therapist to clarify the location interest being treated against initial images, ultrasound, \\npathology, and patient anatomy. Care was taken to ensure snell treated were geometrically accurate and \\nproperly positioned using therapeutic radiology simulation-aided field setting verification per fraction. This \\nprocess is also utilized to determine if any prescription or setup changes are necessary. These steps are \\ntherefore medically necessary to ensure safe and effective administration of radiation. Ongoing therapeutic \\nradiology simulation-aided field setting verification is ordered throughout the course of therapy.\\n\\nA high-frequency ultrasound image was acquired today for a two-dimensional evaluation of the tumor \\nvolume, depth, width, breadth, review of prior response to treatment, provide geometric accuracy of field \\nplacement, and determine whether to proceed with therapeutic delivery.\\n\\nHigh frequency ultrasound depth is 1.94 mm , which is + 0.01 mm in difference from previous imaging.\\n\\nThe field placement and ultrasound imaging, per fraction, is separate and distinct from the initial simulation \\nand is an important task in providing safe administration of superficial radiation therapy. Physician evaluation \\nof the ultrasound information will be ongoing throughout the course of treatment and is deemed medically \\nnecessary to ensure the efficacy of treatment, whether to proceed with therapeutic delivery, and determine\\apollo necessary changes. Today's images were evaluated for determination of continuation of treatment with \\nthe current plan or with necessary changes as appropriate. According to the review of verification \\ntherapeutic radiology simulation-aided field setting and imaging, no change is required. \\nAdditionally, the use of ultrasound visualization and targeted assessment allows the patient to be able to see \\nher cancer progress, encouraging the patient to complete and maintain compliance through the full \\ncourse of prescribed radiotherapy. Per Dr. ROSE Arias, continued ultrasound guidance and therapeutic radiology \\nsimulation-aided field setting verification per fraction is required for field placement, measurement of tumor \\ndepth, tissues evaluation, progress, acute effect monitoring, and determination for therapeutic treatment \\ndelivery is appropriate.

## 2022-11-15 NOTE — PROCEDURE: TREATMENT REGIMEN
Detail Level: Detailed
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 Written informed consent has been previously obtained from this patient for this treatment. This \\nconsent is documented in the patient's chart. The patient gave verbal consent to continue treatment today. \\nThe patient was treated with a specific radiation dose and setup as prescribed by the provider listed on this \\nvisit note. A Radiation Therapist performed administration of radiation under the supervision of a provider. \\nThe treatment parameters and cumulative dose are indicated above. Prior to administering the radiation, the \\npatient underwent a verification therapeutic radiology simulation-aided field setting defining relevant normal \\nand abnormal target anatomy and acquiring images with a separate and distinct diagnostic high-frequency \\nultrasound to delineate tissues and determine whether to proceed with delivery of therapeutic, in addition to \\nretrieve data necessary to develop an optimal radiation treatment process for the patient. The field \\nplacement simulation documents any change seen in overall tumor volume documented in the patient’s \\nrecord, allows the clinician to indicate any needed changes in the treatment plan and/or prescription, \\nprovides diagnostic evaluation as the basis for performing the therapeutic procedure, and clearly identifies \\nthe information needed to decide to proceed with the therapeutic procedure. This process includes \\nverification of the treatment port and proper treatment positioning. All treatment ports were \\nphotographed within electronic medical records. The patient's lead blocking along with gross tumor volume \\nand margin was confirmed. Considering superficial radiotherapy is clinical in setup, this requires the physician \\nand radiation therapist to clarify the location interest being treated against initial images, ultrasound, \\npathology, and patient anatomy. Care was taken to ensure snell treated were geometrically accurate and \\nproperly positioned using therapeutic radiology simulation-aided field setting verification per fraction. This \\nprocess is also utilized to determine if any prescription or setup changes are necessary. These steps are \\ntherefore medically necessary to ensure safe and effective administration of radiation. Ongoing therapeutic \\nradiology simulation-aided field setting verification is ordered throughout the course of therapy.\\n\\nA high-frequency ultrasound image was acquired today for a two-dimensional evaluation of the tumor \\nvolume, depth, width, breadth, review of prior response to treatment, provide geometric accuracy of field \\nplacement, and determine whether to proceed with therapeutic delivery.\\n\\nHigh frequency ultrasound depth is 2.35 mm , which is + 0.41 mm in difference from previous imaging.\\n\\nThe field placement and ultrasound imaging, per fraction, is separate and distinct from the initial simulation \\nand is an important task in providing safe administration of superficial radiation therapy. Physician evaluation \\nof the ultrasound information will be ongoing throughout the course of treatment and is deemed medically \\nnecessary to ensure the efficacy of treatment, whether to proceed with therapeutic delivery, and determine\\apollo necessary changes. Today's images were evaluated for determination of continuation of treatment with \\nthe current plan or with necessary changes as appropriate. According to the review of verification \\ntherapeutic radiology simulation-aided field setting and imaging, no change is required. \\nAdditionally, the use of ultrasound visualization and targeted assessment allows the patient to be able to see \\nher cancer progress, encouraging the patient to complete and maintain compliance through the full \\ncourse of prescribed radiotherapy. Per Dr. ROSE Arias, continued ultrasound guidance and therapeutic radiology \\nsimulation-aided field setting verification per fraction is required for field placement, measurement of tumor \\ndepth, tissues evaluation, progress, acute effect monitoring, and determination for therapeutic treatment \\ndelivery is appropriate.

## 2022-11-15 NOTE — PROCEDURE: SUPERFICIAL RADIATION TREATMENT
Intro Statement (Will Not Render If Left Blank): The patient is undergoing superficial radiation therapy for skin cancer and presents for weekly \\nevaluation and management. Per protocol and as documented on the flow sheet, the patient was questioned \\perry to subjective redness, pruritus, pain, drainage, fatigue, or any other symptoms. Objectively, the radiation \\narea was evaluated with regards to erythema, atrophy, scale, crusting, erosion, ulceration, edema, purpura, \\ntenderness, warmth, drainage, and any other findings. The plan was extensively reviewed including dose and \\ndosing schedule. The simulation and clinical setup were also reviewed as were external \\nshields and based on this review the appropriateness and sufficiency of treatment was determined.\\nA high-frequency ultrasound image was acquired today for a two-dimensional evaluation of the tumor \\nvolume, depth, width, breadth, review of prior response to treatment, provide geometric accuracy of field \\nplacement, and determine whether to proceed with therapeutic delivery.\\n\\nHigh frequency ultrasound depth is 1.94 mm, which is + 01 mm in difference from previous imaging.\\n\\nPer Dr. ORSE Arias, continued ultrasound guidance and therapeutic radiology simulation-aided field setting \\nverification per fraction is required for field placement, measurement of tumor depth, tissues evaluation, \\nprogress, acute effect monitoring, and determination for therapeutic treatment delivery is appropriate.\\nSubjective:\\nRedness\\nObjective:\\nErythema\\nAssessment:\\nAppropriate reaction\\nPlan:\\nContinue current treatment regimen\\n Intro Statement (Will Not Render If Left Blank): The patient is undergoing superficial radiation therapy for skin cancer and presents for weekly \\nevaluation and management. Per protocol and as documented on the flow sheet, the patient was questioned \\perry to subjective redness, pruritus, pain, drainage, fatigue, or any other symptoms. Objectively, the radiation \\narea was evaluated with regards to erythema, atrophy, scale, crusting, erosion, ulceration, edema, purpura, \\ntenderness, warmth, drainage, and any other findings. The plan was extensively reviewed including dose and \\ndosing schedule. The simulation and clinical setup were also reviewed as were external \\nshields and based on this review the appropriateness and sufficiency of treatment was determined.\\nA high-frequency ultrasound image was acquired today for a two-dimensional evaluation of the tumor \\nvolume, depth, width, breadth, review of prior response to treatment, provide geometric accuracy of field \\nplacement, and determine whether to proceed with therapeutic delivery.\\n\\nHigh frequency ultrasound depth is 1.94 mm, which is + 01 mm in difference from previous imaging.\\n\\nPer Dr. ROSE Arias, continued ultrasound guidance and therapeutic radiology simulation-aided field setting \\nverification per fraction is required for field placement, measurement of tumor depth, tissues evaluation, \\nprogress, acute effect monitoring, and determination for therapeutic treatment delivery is appropriate.\\nSubjective:\\nRedness\\nObjective:\\nErythema\\nAssessment:\\nAppropriate reaction\\nPlan:\\nContinue current treatment regimen\\n

## 2022-11-17 ENCOUNTER — APPOINTMENT (OUTPATIENT)
Dept: URBAN - METROPOLITAN AREA CLINIC 319 | Age: 82
Setting detail: DERMATOLOGY
End: 2022-11-21

## 2022-11-17 PROBLEM — C44.311 BASAL CELL CARCINOMA OF SKIN OF NOSE: Status: ACTIVE | Noted: 2022-11-17

## 2022-11-17 PROCEDURE — 77401 RADIATION TX DELIVERY SUPFC: CPT

## 2022-11-17 PROCEDURE — 77280 THER RAD SIMULAJ FIELD SMPL: CPT

## 2022-11-17 PROCEDURE — OTHER FOLLOW UP FOR NEXT VISIT: OTHER

## 2022-11-17 PROCEDURE — OTHER SUPERFICIAL RADIATION TREATMENT: OTHER

## 2022-11-17 PROCEDURE — G6001 ECHO GUIDANCE RADIOTHERAPY: HCPCS | Mod: XU

## 2022-11-17 PROCEDURE — OTHER TREATMENT REGIMEN: OTHER

## 2022-11-17 NOTE — PROCEDURE: TREATMENT REGIMEN
Detail Level: Detailed
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 Written informed consent has been previously obtained from this patient for this treatment. This \\nconsent is documented in the patient's chart. The patient gave verbal consent to continue treatment today. \\nThe patient was treated with a specific radiation dose and setup as prescribed by the provider listed on this \\nvisit note. A Radiation Therapist performed administration of radiation under the supervision of a provider. \\nThe treatment parameters and cumulative dose are indicated above. Prior to administering the radiation, the \\npatient underwent a verification therapeutic radiology simulation-aided field setting defining relevant normal \\nand abnormal target anatomy and acquiring images with a separate and distinct diagnostic high-frequency \\nultrasound to delineate tissues and determine whether to proceed with delivery of therapeutic, in addition to \\nretrieve data necessary to develop an optimal radiation treatment process for the patient. The field \\nplacement simulation documents any change seen in overall tumor volume documented in the patient’s \\nrecord, allows the clinician to indicate any needed changes in the treatment plan and/or prescription, \\nprovides diagnostic evaluation as the basis for performing the therapeutic procedure, and clearly identifies \\nthe information needed to decide to proceed with the therapeutic procedure. This process includes \\nverification of the treatment port and proper treatment positioning. All treatment ports were \\nphotographed within electronic medical records. The patient's lead blocking along with gross tumor volume \\nand margin was confirmed. Considering superficial radiotherapy is clinical in setup, this requires the physician \\nand radiation therapist to clarify the location interest being treated against initial images, ultrasound, \\npathology, and patient anatomy. Care was taken to ensure snell treated were geometrically accurate and \\nproperly positioned using therapeutic radiology simulation-aided field setting verification per fraction. This \\nprocess is also utilized to determine if any prescription or setup changes are necessary. These steps are \\ntherefore medically necessary to ensure safe and effective administration of radiation. Ongoing therapeutic \\nradiology simulation-aided field setting verification is ordered throughout the course of therapy.\\n\\nA high-frequency ultrasound image was acquired today for a two-dimensional evaluation of the tumor \\nvolume, depth, width, breadth, review of prior response to treatment, provide geometric accuracy of field \\nplacement, and determine whether to proceed with therapeutic delivery.\\n\\nHigh frequency ultrasound depth is 2.95 mm , which is + 0.60 mm in difference from previous imaging.\\n\\nThe field placement and ultrasound imaging, per fraction, is separate and distinct from the initial simulation \\nand is an important task in providing safe administration of superficial radiation therapy. Physician evaluation \\nof the ultrasound information will be ongoing throughout the course of treatment and is deemed medically \\nnecessary to ensure the efficacy of treatment, whether to proceed with therapeutic delivery, and determine\\apollo necessary changes. Today's images were evaluated for determination of continuation of treatment with \\nthe current plan or with necessary changes as appropriate. According to the review of verification \\ntherapeutic radiology simulation-aided field setting and imaging, no change is required. \\nAdditionally, the use of ultrasound visualization and targeted assessment allows the patient to be able to see \\nher cancer progress, encouraging the patient to complete and maintain compliance through the full \\ncourse of prescribed radiotherapy. Per Dr. ROSE Arias, continued ultrasound guidance and therapeutic radiology \\nsimulation-aided field setting verification per fraction is required for field placement, measurement of tumor \\ndepth, tissues evaluation, progress, acute effect monitoring, and determination for therapeutic treatment \\ndelivery is appropriate.

## 2022-11-17 NOTE — PROCEDURE: SUPERFICIAL RADIATION TREATMENT
Daily Fractionated Dose (Optional- Include Units): 277.65 cGy
Total Number Of Fractions Rx 2: 15
Number Of Treatment Days: 1
Add Physics Consultation: No
Field Size (Applicator): 2.5 cm
Fractionation Number: 12
Initial Radiation Treatment Planning (Will Render If Bill Simulation = Yes): The patient had a complete consultation regarding all applicable modalities for the treatment of their skin cancer and based on a variety of factors including the type of tumor, size, and location, the relevant medical history as well as local tissue factors, the functional status of the individual, the ability to perform necessary postoperative wound instructions and the need for simultaneous treatments as well as overall wound healing status, it was determined that the patient would begin radiation therapy treatment for skin cancer.  A full simulation and treatment device design was performed including the determination and formulation of appropriate simple and complex devices including lead shield of 0.762 mm thickness to form molded customized shielding to specifically correlate with the lesion size including treatment margin.  The custom lead shield is adequate to accommodate the appropriate applicator and provide adequate shielding around the treatment site.  The specific field applicator, shields, and devices both simple and complex as well as the specific patient setup is outlined below.  The patient was given a full consent for superficial radiation to both verbally and in writing and the full determination of patient's eligibility for treatment and selection is outlined on the patient eligibility and treatment selection form.  The specific superficial radiotherapy prescription was determined and was documented on the superficial radiotherapy prescription form.  A treatment calculation was also performed and documented on the treatment calculation form.  Based on the prescription, the patient was scheduled for a series of fractional treatments.
Port Dimensions-Y Axis In Cm: 1.7
Energy (Include Units): 100
Energy (Optional-Please Include Units): 100 kV
Total Dose (Optional-Please Include Units): 5553.00 cGy
Fractions / Week Rx 2: 5
Field Size (Applicator): 2.0 cm
Day Of The Week Treatment Administered: Thursday
Intro Statement (Will Not Render If Left Blank): The patient is undergoing superficial radiation therapy for skin cancer and presents for weekly \\nevaluation and management. Per protocol and as documented on the flow sheet, the patient was questioned \\perry to subjective redness, pruritus, pain, drainage, fatigue, or any other symptoms. Objectively, the radiation \\narea was evaluated with regards to erythema, atrophy, scale, crusting, erosion, ulceration, edema, purpura, \\ntenderness, warmth, drainage, and any other findings. The plan was extensively reviewed including dose and \\ndosing schedule. The simulation and clinical setup were also reviewed as were external \\nshields and based on this review the appropriateness and sufficiency of treatment was determined.\\nA high-frequency ultrasound image was acquired today for a two-dimensional evaluation of the tumor \\nvolume, depth, width, breadth, review of prior response to treatment, provide geometric accuracy of field \\nplacement, and determine whether to proceed with therapeutic delivery.\\n\\nHigh frequency ultrasound depth is 1.94 mm, which is + 01 mm in difference from previous imaging.\\n\\nPer Dr. ROSE Arias, continued ultrasound guidance and therapeutic radiology simulation-aided field setting \\nverification per fraction is required for field placement, measurement of tumor depth, tissues evaluation, \\nprogress, acute effect monitoring, and determination for therapeutic treatment delivery is appropriate.\\nSubjective:\\nRedness\\nObjective:\\nErythema\\nAssessment:\\nAppropriate reaction\\nPlan:\\nContinue current treatment regimen\\n
Simple Simulation Preamble Text Will Be Included With Simple Simulations (.......... Indications): Simple simulation was performed today for the following reasons:
Depth (Optional-Please Include Units): 2.86
Bill And Render Text From Evaluation And Management Tab (Will Bill 34739): Yes
Ultrasound Used Text: Ultrasound was utilized to place radiation therapy fields.
Functional Status: 0 (fully active)
Dimensions-Y Axis In Cm: 1.2
Cumulative Dose In Cgy (Optional): 3331.80
Detail Level: Detailed
Treatment Time In Min (Optional): .45
Fractions / Week: 3
Information: Selecting Yes will display possible errors in your note based on the variables you have selected. This validation is only offered as a suggestion for you. PLEASE NOTE THAT THE VALIDATION TEXT WILL BE REMOVED WHEN YOU FINALIZE YOUR NOTE. IF YOU WANT TO FAX A PRELIMINARY NOTE YOU WILL NEED TO TOGGLE THIS TO 'NO' IF YOU DO NOT WANT IT IN YOUR FAXED NOTE.
Fractionation Number (Evaluation): 10
Custom Shielding Preamble Text Will Not Be Included With Simple Simulations (.......... X X Y Cm): A lead shield of 0.762 mm thickness is utilized to form a molded, custom shield with a
Assessment: Appropriate reaction
Ultrasound Used Text: .
Treatment Margins In Cm: 0.5
Treatment Time / Fractionation (Optional- Include Units): .45 min
Patient Positioning: Sitting
Treatment Device Design After Initial Simulation Justification (Will Render If Bill For Treatment Devices = Yes): The patient is status post radiation simulation and is evaluated as to the use of additional devices for shielding and placement for radiation therapy.
Time Dose Fractionation (Optional- Include Units If Applicable): 99
Dose Per Fractionation In Cgy (Optional): 277.65
Radiation Units: cGy
Please Choose The Type Of Visit (Required): Treatment Visit: Show Treatment Variables
Custom Shielding Afterword Text Will Not Be Included With Simple Simulations (X X Y Cm............): port to correlate with the lesion size, including treatment margin. The custom lead shield is adequate to accommodate the appropriate applicator and provide adequate shielding around the treatment site. Additional shielding (as noted below) is used to protect sensitive, normal tissues.
Total Number Of Fractions: 20
Use Automated Fraction Number And Cumulative Dosage?: No (Maintain Current Freetext Entry)

## 2022-11-19 ENCOUNTER — APPOINTMENT (OUTPATIENT)
Dept: URBAN - METROPOLITAN AREA CLINIC 319 | Age: 82
Setting detail: DERMATOLOGY
End: 2022-12-30

## 2022-11-19 PROCEDURE — 77336 RADIATION PHYSICS CONSULT: CPT

## 2022-11-21 ENCOUNTER — APPOINTMENT (OUTPATIENT)
Dept: URBAN - METROPOLITAN AREA CLINIC 319 | Age: 82
Setting detail: DERMATOLOGY
End: 2022-11-23

## 2022-11-21 PROBLEM — C44.311 BASAL CELL CARCINOMA OF SKIN OF NOSE: Status: ACTIVE | Noted: 2022-11-21

## 2022-11-21 PROCEDURE — G6001 ECHO GUIDANCE RADIOTHERAPY: HCPCS | Mod: XU

## 2022-11-21 PROCEDURE — 77401 RADIATION TX DELIVERY SUPFC: CPT

## 2022-11-21 PROCEDURE — OTHER SUPERFICIAL RADIATION TREATMENT: OTHER

## 2022-11-21 PROCEDURE — OTHER TREATMENT REGIMEN: OTHER

## 2022-11-21 PROCEDURE — 77280 THER RAD SIMULAJ FIELD SMPL: CPT

## 2022-11-21 PROCEDURE — OTHER FOLLOW UP FOR NEXT VISIT: OTHER

## 2022-11-21 NOTE — PROCEDURE: SUPERFICIAL RADIATION TREATMENT
Additional Change To Daily Dosage Administered Mid Treatment?: No
Fractions / Week Rx 2: 5
Treatment Time / Fractionation (Optional- Include Units): .45 min
Field Size (Applicator): 2.0 cm
Treatment Device Design After Initial Simulation Justification (Will Render If Bill For Treatment Devices = Yes): The patient is status post radiation simulation and is evaluated as to the use of additional devices for shielding and placement for radiation therapy.
Number Of Treatment Days: 1
Field Size (Applicator): 2.5 cm
Dimensions-X Axis In Cm: 1.2
Treatment Time In Min (Optional): .45
Was Ultrasound Performed Today?: Yes
Custom Shielding Afterword Text Will Not Be Included With Simple Simulations (X X Y Cm............): port to correlate with the lesion size, including treatment margin. The custom lead shield is adequate to accommodate the appropriate applicator and provide adequate shielding around the treatment site. Additional shielding (as noted below) is used to protect sensitive, normal tissues.
Depth (Optional-Please Include Units): 2.86
Please Choose The Type Of Visit (Required): Treatment Visit: Show Treatment Variables
Ultrasound Used Text: Ultrasound was utilized to place radiation therapy fields.
Functional Status: 0 (fully active)
Total Number Of Fractions: 20
Intro Statement (Will Not Render If Left Blank): The patient is undergoing superficial radiation therapy for skin cancer and presents for weekly \\nevaluation and management. Per protocol and as documented on the flow sheet, the patient was questioned \\perry to subjective redness, pruritus, pain, drainage, fatigue, or any other symptoms. Objectively, the radiation \\narea was evaluated with regards to erythema, atrophy, scale, crusting, erosion, ulceration, edema, purpura, \\ntenderness, warmth, drainage, and any other findings. The plan was extensively reviewed including dose and \\ndosing schedule. The simulation and clinical setup were also reviewed as were external \\nshields and based on this review the appropriateness and sufficiency of treatment was determined.\\nA high-frequency ultrasound image was acquired today for a two-dimensional evaluation of the tumor \\nvolume, depth, width, breadth, review of prior response to treatment, provide geometric accuracy of field \\nplacement, and determine whether to proceed with therapeutic delivery.\\n\\nHigh frequency ultrasound depth is 1.94 mm, which is + 01 mm in difference from previous imaging.\\n\\nPer Dr. ROSE Arias, continued ultrasound guidance and therapeutic radiology simulation-aided field setting \\nverification per fraction is required for field placement, measurement of tumor depth, tissues evaluation, \\nprogress, acute effect monitoring, and determination for therapeutic treatment delivery is appropriate.\\nSubjective:\\nRedness\\nObjective:\\nErythema\\nAssessment:\\nAppropriate reaction\\nPlan:\\nContinue current treatment regimen\\n
Dose / Tx In Cgy (Optional): 277.65
Total Number Of Fractions Rx 2: 15
Fractions / Week: 3
Initial Radiation Treatment Planning (Will Render If Bill Simulation = Yes): The patient had a complete consultation regarding all applicable modalities for the treatment of their skin cancer and based on a variety of factors including the type of tumor, size, and location, the relevant medical history as well as local tissue factors, the functional status of the individual, the ability to perform necessary postoperative wound instructions and the need for simultaneous treatments as well as overall wound healing status, it was determined that the patient would begin radiation therapy treatment for skin cancer.  A full simulation and treatment device design was performed including the determination and formulation of appropriate simple and complex devices including lead shield of 0.762 mm thickness to form molded customized shielding to specifically correlate with the lesion size including treatment margin.  The custom lead shield is adequate to accommodate the appropriate applicator and provide adequate shielding around the treatment site.  The specific field applicator, shields, and devices both simple and complex as well as the specific patient setup is outlined below.  The patient was given a full consent for superficial radiation to both verbally and in writing and the full determination of patient's eligibility for treatment and selection is outlined on the patient eligibility and treatment selection form.  The specific superficial radiotherapy prescription was determined and was documented on the superficial radiotherapy prescription form.  A treatment calculation was also performed and documented on the treatment calculation form.  Based on the prescription, the patient was scheduled for a series of fractional treatments.
Custom Shielding Preamble Text Will Not Be Included With Simple Simulations (.......... X X Y Cm): A lead shield of 0.762 mm thickness is utilized to form a molded, custom shield with a
Ultrasound Used Text: .
Energy (Optional-Please Include Units): 100 kV
Total Dose (Optional-Please Include Units): 5553.00 cGy
Treatment Margins In Cm: 0.5
Cumulative Dose In Cgy (Optional): 3609.45
Patient Positioning: Sitting
Information: Selecting Yes will display possible errors in your note based on the variables you have selected. This validation is only offered as a suggestion for you. PLEASE NOTE THAT THE VALIDATION TEXT WILL BE REMOVED WHEN YOU FINALIZE YOUR NOTE. IF YOU WANT TO FAX A PRELIMINARY NOTE YOU WILL NEED TO TOGGLE THIS TO 'NO' IF YOU DO NOT WANT IT IN YOUR FAXED NOTE.
Time Dose Fractionation (Optional- Include Units If Applicable): 99
Fractionation Number: 13
Fractionation Number (Evaluation): 10
Simple Simulation Preamble Text Will Be Included With Simple Simulations (.......... Indications): Simple simulation was performed today for the following reasons:
Energy (Include Units): 100
Assessment: Appropriate reaction
Port Dimensions-X Axis In Cm: 1.7
Day Of The Week Treatment Administered: Monday
Detail Level: Detailed
Radiation Units: cGy
Daily Fractionated Dose (Optional- Include Units): 277.65 cGy
Use Automated Fraction Number And Cumulative Dosage?: No (Maintain Current Freetext Entry)

## 2022-11-21 NOTE — PROCEDURE: TREATMENT REGIMEN
Detail Level: Detailed
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 Written informed consent has been previously obtained from this patient for this treatment. This \\nconsent is documented in the patient's chart. The patient gave verbal consent to continue treatment today. \\nThe patient was treated with a specific radiation dose and setup as prescribed by the provider listed on this \\nvisit note. A Radiation Therapist performed administration of radiation under the supervision of a provider. \\nThe treatment parameters and cumulative dose are indicated above. Prior to administering the radiation, the \\npatient underwent a verification therapeutic radiology simulation-aided field setting defining relevant normal \\nand abnormal target anatomy and acquiring images with a separate and distinct diagnostic high-frequency \\nultrasound to delineate tissues and determine whether to proceed with delivery of therapeutic, in addition to \\nretrieve data necessary to develop an optimal radiation treatment process for the patient. The field \\nplacement simulation documents any change seen in overall tumor volume documented in the patient’s \\nrecord, allows the clinician to indicate any needed changes in the treatment plan and/or prescription, \\nprovides diagnostic evaluation as the basis for performing the therapeutic procedure, and clearly identifies \\nthe information needed to decide to proceed with the therapeutic procedure. This process includes \\nverification of the treatment port and proper treatment positioning. All treatment ports were \\nphotographed within electronic medical records. The patient's lead blocking along with gross tumor volume \\nand margin was confirmed. Considering superficial radiotherapy is clinical in setup, this requires the physician \\nand radiation therapist to clarify the location interest being treated against initial images, ultrasound, \\npathology, and patient anatomy. Care was taken to ensure snell treated were geometrically accurate and \\nproperly positioned using therapeutic radiology simulation-aided field setting verification per fraction. This \\nprocess is also utilized to determine if any prescription or setup changes are necessary. These steps are \\ntherefore medically necessary to ensure safe and effective administration of radiation. Ongoing therapeutic \\nradiology simulation-aided field setting verification is ordered throughout the course of therapy.\\n\\nA high-frequency ultrasound image was acquired today for a two-dimensional evaluation of the tumor \\nvolume, depth, width, breadth, review of prior response to treatment, provide geometric accuracy of field \\nplacement, and determine whether to proceed with therapeutic delivery.\\n\\nHigh frequency ultrasound depth is 1.59 mm , which is - 1.36 mm in difference from previous imaging.\\n\\nThe field placement and ultrasound imaging, per fraction, is separate and distinct from the initial simulation \\nand is an important task in providing safe administration of superficial radiation therapy. Physician evaluation \\nof the ultrasound information will be ongoing throughout the course of treatment and is deemed medically \\nnecessary to ensure the efficacy of treatment, whether to proceed with therapeutic delivery, and determine\\apollo necessary changes. Today's images were evaluated for determination of continuation of treatment with \\nthe current plan or with necessary changes as appropriate. According to the review of verification \\ntherapeutic radiology simulation-aided field setting and imaging, no change is required. \\nAdditionally, the use of ultrasound visualization and targeted assessment allows the patient to be able to see \\nher cancer progress, encouraging the patient to complete and maintain compliance through the full \\ncourse of prescribed radiotherapy. Per Dr. ROSE Arias, continued ultrasound guidance and therapeutic radiology \\nsimulation-aided field setting verification per fraction is required for field placement, measurement of tumor \\ndepth, tissues evaluation, progress, acute effect monitoring, and determination for therapeutic treatment \\ndelivery is appropriate.

## 2022-11-22 ENCOUNTER — APPOINTMENT (OUTPATIENT)
Dept: URBAN - METROPOLITAN AREA CLINIC 319 | Age: 82
Setting detail: DERMATOLOGY
End: 2022-11-23

## 2022-11-22 PROBLEM — C44.311 BASAL CELL CARCINOMA OF SKIN OF NOSE: Status: ACTIVE | Noted: 2022-11-22

## 2022-11-22 PROCEDURE — G6001 ECHO GUIDANCE RADIOTHERAPY: HCPCS | Mod: XU

## 2022-11-22 PROCEDURE — 77401 RADIATION TX DELIVERY SUPFC: CPT

## 2022-11-22 PROCEDURE — 77280 THER RAD SIMULAJ FIELD SMPL: CPT

## 2022-11-22 PROCEDURE — OTHER FOLLOW UP FOR NEXT VISIT: OTHER

## 2022-11-22 PROCEDURE — OTHER SUPERFICIAL RADIATION TREATMENT: OTHER

## 2022-11-22 PROCEDURE — OTHER TREATMENT REGIMEN: OTHER

## 2022-11-22 NOTE — PROCEDURE: TREATMENT REGIMEN
Detail Level: Detailed
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 Written informed consent has been previously obtained from this patient for this treatment. This \\nconsent is documented in the patient's chart. The patient gave verbal consent to continue treatment today. \\nThe patient was treated with a specific radiation dose and setup as prescribed by the provider listed on this \\nvisit note. A Radiation Therapist performed administration of radiation under the supervision of a provider. \\nThe treatment parameters and cumulative dose are indicated above. Prior to administering the radiation, the \\npatient underwent a verification therapeutic radiology simulation-aided field setting defining relevant normal \\nand abnormal target anatomy and acquiring images with a separate and distinct diagnostic high-frequency \\nultrasound to delineate tissues and determine whether to proceed with delivery of therapeutic, in addition to \\nretrieve data necessary to develop an optimal radiation treatment process for the patient. The field \\nplacement simulation documents any change seen in overall tumor volume documented in the patient’s \\nrecord, allows the clinician to indicate any needed changes in the treatment plan and/or prescription, \\nprovides diagnostic evaluation as the basis for performing the therapeutic procedure, and clearly identifies \\nthe information needed to decide to proceed with the therapeutic procedure. This process includes \\nverification of the treatment port and proper treatment positioning. All treatment ports were \\nphotographed within electronic medical records. The patient's lead blocking along with gross tumor volume \\nand margin was confirmed. Considering superficial radiotherapy is clinical in setup, this requires the physician \\nand radiation therapist to clarify the location interest being treated against initial images, ultrasound, \\npathology, and patient anatomy. Care was taken to ensure snell treated were geometrically accurate and \\nproperly positioned using therapeutic radiology simulation-aided field setting verification per fraction. This \\nprocess is also utilized to determine if any prescription or setup changes are necessary. These steps are \\ntherefore medically necessary to ensure safe and effective administration of radiation. Ongoing therapeutic \\nradiology simulation-aided field setting verification is ordered throughout the course of therapy.\\n\\nA high-frequency ultrasound image was acquired today for a two-dimensional evaluation of the tumor \\nvolume, depth, width, breadth, review of prior response to treatment, provide geometric accuracy of field \\nplacement, and determine whether to proceed with therapeutic delivery.\\n\\nHigh frequency ultrasound depth is 2.15  mm , which is + 0.56 mm in difference from previous imaging.\\n\\nThe field placement and ultrasound imaging, per fraction, is separate and distinct from the initial simulation \\nand is an important task in providing safe administration of superficial radiation therapy. Physician evaluation \\nof the ultrasound information will be ongoing throughout the course of treatment and is deemed medically \\nnecessary to ensure the efficacy of treatment, whether to proceed with therapeutic delivery, and determine\\apollo necessary changes. Today's images were evaluated for determination of continuation of treatment with \\nthe current plan or with necessary changes as appropriate. According to the review of verification \\ntherapeutic radiology simulation-aided field setting and imaging, no change is required. \\nAdditionally, the use of ultrasound visualization and targeted assessment allows the patient to be able to see \\nher cancer progress, encouraging the patient to complete and maintain compliance through the full \\ncourse of prescribed radiotherapy. Per Dr. ROSE Arias, continued ultrasound guidance and therapeutic radiology \\nsimulation-aided field setting verification per fraction is required for field placement, measurement of tumor \\ndepth, tissues evaluation, progress, acute effect monitoring, and determination for therapeutic treatment \\ndelivery is appropriate.

## 2022-11-22 NOTE — PROCEDURE: SUPERFICIAL RADIATION TREATMENT
Bill For Treatment Planning (One Time Charge Per Course Of Therapy): No
Treatment Device Design After Initial Simulation Justification (Will Render If Bill For Treatment Devices = Yes): The patient is status post radiation simulation and is evaluated as to the use of additional devices for shielding and placement for radiation therapy.
Field Size (Applicator): 2.5 cm
Dimensions-X Axis In Cm: 1.2
Validate Note Data (See Information Below): Yes
Day Of The Week Treatment Administered: Tuesday
Total Number Of Fractions Rx 6: 15
Prescription Used: 1
Custom Shielding Afterword Text Will Not Be Included With Simple Simulations (X X Y Cm............): port to correlate with the lesion size, including treatment margin. The custom lead shield is adequate to accommodate the appropriate applicator and provide adequate shielding around the treatment site. Additional shielding (as noted below) is used to protect sensitive, normal tissues.
Depth (Optional-Please Include Units): 2.86
Ultrasound Used Text: Ultrasound was utilized to place radiation therapy fields.
Treatment Time In Min (Optional): .45
Fractions / Week: 3
Energy (Optional-Please Include Units): 100 kV
Initial Radiation Treatment Planning (Will Render If Bill Simulation = Yes): The patient had a complete consultation regarding all applicable modalities for the treatment of their skin cancer and based on a variety of factors including the type of tumor, size, and location, the relevant medical history as well as local tissue factors, the functional status of the individual, the ability to perform necessary postoperative wound instructions and the need for simultaneous treatments as well as overall wound healing status, it was determined that the patient would begin radiation therapy treatment for skin cancer.  A full simulation and treatment device design was performed including the determination and formulation of appropriate simple and complex devices including lead shield of 0.762 mm thickness to form molded customized shielding to specifically correlate with the lesion size including treatment margin.  The custom lead shield is adequate to accommodate the appropriate applicator and provide adequate shielding around the treatment site.  The specific field applicator, shields, and devices both simple and complex as well as the specific patient setup is outlined below.  The patient was given a full consent for superficial radiation to both verbally and in writing and the full determination of patient's eligibility for treatment and selection is outlined on the patient eligibility and treatment selection form.  The specific superficial radiotherapy prescription was determined and was documented on the superficial radiotherapy prescription form.  A treatment calculation was also performed and documented on the treatment calculation form.  Based on the prescription, the patient was scheduled for a series of fractional treatments.
Fractions / Week Rx 2: 5
Intro Statement (Will Not Render If Left Blank): The patient is undergoing superficial radiation therapy for skin cancer and presents for weekly \\nevaluation and management. Per protocol and as documented on the flow sheet, the patient was questioned \\perry to subjective redness, pruritus, pain, drainage, fatigue, or any other symptoms. Objectively, the radiation \\narea was evaluated with regards to erythema, atrophy, scale, crusting, erosion, ulceration, edema, purpura, \\ntenderness, warmth, drainage, and any other findings. The plan was extensively reviewed including dose and \\ndosing schedule. The simulation and clinical setup were also reviewed as were external \\nshields and based on this review the appropriateness and sufficiency of treatment was determined.\\nA high-frequency ultrasound image was acquired today for a two-dimensional evaluation of the tumor \\nvolume, depth, width, breadth, review of prior response to treatment, provide geometric accuracy of field \\nplacement, and determine whether to proceed with therapeutic delivery.\\n\\nHigh frequency ultrasound depth is 1.94 mm, which is + 01 mm in difference from previous imaging.\\n\\nPer Dr. ROSE Arias, continued ultrasound guidance and therapeutic radiology simulation-aided field setting \\nverification per fraction is required for field placement, measurement of tumor depth, tissues evaluation, \\nprogress, acute effect monitoring, and determination for therapeutic treatment delivery is appropriate.\\nSubjective:\\nRedness\\nObjective:\\nErythema\\nAssessment:\\nAppropriate reaction\\nPlan:\\nContinue current treatment regimen\\n
Ultrasound Used Text: .
Treatment Time / Fractionation (Optional- Include Units): .45 min
Field Size (Applicator): 2.0 cm
Dose / Tx In Cgy (Optional): 277.65
Time Dose Fractionation (Optional- Include Units If Applicable): 99
Port Dimensions-X Axis In Cm: 1.7
Simple Simulation Preamble Text Will Be Included With Simple Simulations (.......... Indications): Simple simulation was performed today for the following reasons:
Please Choose The Type Of Visit (Required): Treatment Visit: Show Treatment Variables
Total Number Of Fractions: 20
Functional Status: 0 (fully active)
Use Automated Fraction Number And Cumulative Dosage?: No (Maintain Current Freetext Entry)
Detail Level: Detailed
Cumulative Dose In Cgy (Optional): 3887.10
Daily Fractionated Dose (Optional- Include Units): 277.65 cGy
Information: Selecting Yes will display possible errors in your note based on the variables you have selected. This validation is only offered as a suggestion for you. PLEASE NOTE THAT THE VALIDATION TEXT WILL BE REMOVED WHEN YOU FINALIZE YOUR NOTE. IF YOU WANT TO FAX A PRELIMINARY NOTE YOU WILL NEED TO TOGGLE THIS TO 'NO' IF YOU DO NOT WANT IT IN YOUR FAXED NOTE.
Fractionation Number: 14
Fractionation Number (Evaluation): 10
Energy (Include Units): 100
Assessment: Appropriate reaction
Total Dose (Optional-Please Include Units): 5553.00 cGy
Treatment Margins In Cm: 0.5
Custom Shielding Preamble Text Will Not Be Included With Simple Simulations (.......... X X Y Cm): A lead shield of 0.762 mm thickness is utilized to form a molded, custom shield with a
Radiation Units: cGy
Patient Positioning: Sitting

## 2022-11-29 ENCOUNTER — APPOINTMENT (OUTPATIENT)
Dept: URBAN - METROPOLITAN AREA CLINIC 319 | Age: 82
Setting detail: DERMATOLOGY
End: 2022-12-02

## 2022-11-29 PROBLEM — C44.311 BASAL CELL CARCINOMA OF SKIN OF NOSE: Status: ACTIVE | Noted: 2022-11-29

## 2022-11-29 PROCEDURE — 77401 RADIATION TX DELIVERY SUPFC: CPT

## 2022-11-29 PROCEDURE — 77427 RADIATION TX MANAGEMENT X5: CPT

## 2022-11-29 PROCEDURE — OTHER SUPERFICIAL RADIATION TREATMENT: OTHER

## 2022-11-29 PROCEDURE — OTHER FOLLOW UP FOR NEXT VISIT: OTHER

## 2022-11-29 PROCEDURE — OTHER TREATMENT REGIMEN: OTHER

## 2022-11-29 PROCEDURE — 77280 THER RAD SIMULAJ FIELD SMPL: CPT

## 2022-11-29 PROCEDURE — G6001 ECHO GUIDANCE RADIOTHERAPY: HCPCS | Mod: XU

## 2022-11-29 NOTE — PROCEDURE: TREATMENT REGIMEN
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 Written informed consent has been previously obtained from this patient for this treatment. This \\nconsent is documented in the patient's chart. The patient gave verbal consent to continue treatment today. \\nThe patient was treated with a specific radiation dose and setup as prescribed by the provider listed on this \\nvisit note. A Radiation Therapist performed administration of radiation under the supervision of a provider. \\nThe treatment parameters and cumulative dose are indicated above. Prior to administering the radiation, the \\npatient underwent a verification therapeutic radiology simulation-aided field setting defining relevant normal \\nand abnormal target anatomy and acquiring images with a separate and distinct diagnostic high-frequency \\nultrasound to delineate tissues and determine whether to proceed with delivery of therapeutic, in addition to \\nretrieve data necessary to develop an optimal radiation treatment process for the patient. The field \\nplacement simulation documents any change seen in overall tumor volume documented in the patient’s \\nrecord, allows the clinician to indicate any needed changes in the treatment plan and/or prescription, \\nprovides diagnostic evaluation as the basis for performing the therapeutic procedure, and clearly identifies \\nthe information needed to decide to proceed with the therapeutic procedure. This process includes \\nverification of the treatment port and proper treatment positioning. All treatment ports were \\nphotographed within electronic medical records. The patient's lead blocking along with gross tumor volume \\nand margin was confirmed. Considering superficial radiotherapy is clinical in setup, this requires the physician \\nand radiation therapist to clarify the location interest being treated against initial images, ultrasound, \\npathology, and patient anatomy. Care was taken to ensure snell treated were geometrically accurate and \\nproperly positioned using therapeutic radiology simulation-aided field setting verification per fraction. This \\nprocess is also utilized to determine if any prescription or setup changes are necessary. These steps are \\ntherefore medically necessary to ensure safe and effective administration of radiation. Ongoing therapeutic \\nradiology simulation-aided field setting verification is ordered throughout the course of therapy.\\n\\nA high-frequency ultrasound image was acquired today for a two-dimensional evaluation of the tumor \\nvolume, depth, width, breadth, review of prior response to treatment, provide geometric accuracy of field \\nplacement, and determine whether to proceed with therapeutic delivery.\\n\\nHigh frequency ultrasound depth is 2.15  mm , which is + 0.56 mm in difference from previous imaging.\\n\\nThe field placement and ultrasound imaging, per fraction, is separate and distinct from the initial simulation \\nand is an important task in providing safe administration of superficial radiation therapy. Physician evaluation \\nof the ultrasound information will be ongoing throughout the course of treatment and is deemed medically \\nnecessary to ensure the efficacy of treatment, whether to proceed with therapeutic delivery, and determine\\apollo necessary changes. Today's images were evaluated for determination of continuation of treatment with \\nthe current plan or with necessary changes as appropriate. According to the review of verification \\ntherapeutic radiology simulation-aided field setting and imaging, no change is required. \\nAdditionally, the use of ultrasound visualization and targeted assessment allows the patient to be able to see \\nher cancer progress, encouraging the patient to complete and maintain compliance through the full \\ncourse of prescribed radiotherapy. Per Dr. ROSE Arias, continued ultrasound guidance and therapeutic radiology \\nsimulation-aided field setting verification per fraction is required for field placement, measurement of tumor \\ndepth, tissues evaluation, progress, acute effect monitoring, and determination for therapeutic treatment \\ndelivery is appropriate.
Detail Level: Detailed

## 2022-11-29 NOTE — PROCEDURE: SUPERFICIAL RADIATION TREATMENT
Simple Simulation Preamble Text Will Be Included With Simple Simulations (.......... Indications): Simple simulation was performed today for the following reasons:
Total Number Of Fractions Rx 6: 15
Render Patient Eligibility And Selection In Note?: No
Please Choose The Type Of Visit (Required): Treatment and Weekly Evaluation Visit: Show Treatment/Evaluation Variables
Functional Status: 0 (fully active)
Depth (Optional-Please Include Units): 2.86
Number Of Treatment Days: 1
Detail Level: Detailed
Ultrasound Used Text: Ultrasound was utilized to place radiation therapy fields.
Fractions / Week Rx 6: 5
Show Ultrasound In Note?: Yes
Cumulative Dose In Cgy (Optional): 4164.75
Daily Fractionated Dose (Optional- Include Units): 277.65 cGy
Port Dimensions-Y Axis In Cm: 1.7
Energy (Optional-Please Include Units): 100 kV
Treatment Time In Min (Optional): .45
Fractions / Week: 3
Intro Statement (Will Not Render If Left Blank): The patient is undergoing superficial radiation therapy for skin cancer and presents for weekly \\nevaluation and management. Per protocol and as documented on the flow sheet, the patient was questioned \\perry to subjective redness, pruritus, pain, drainage, fatigue, or any other symptoms. Objectively, the radiation \\narea was evaluated with regards to erythema, atrophy, scale, crusting, erosion, ulceration, edema, purpura, \\ntenderness, warmth, drainage, and any other findings. The plan was extensively reviewed including dose and \\ndosing schedule. The simulation and clinical setup were also reviewed as were external \\nshields and based on this review the appropriateness and sufficiency of treatment was determined.\\nA high-frequency ultrasound image was acquired today for a two-dimensional evaluation of the tumor \\nvolume, depth, width, breadth, review of prior response to treatment, provide geometric accuracy of field \\nplacement, and determine whether to proceed with therapeutic delivery.\\n\\nHigh frequency ultrasound depth is 1.84 mm, which is - 0.21mm in difference from previous imaging.\\n\\nPer Dr. ROSE Arias, continued ultrasound guidance and therapeutic radiology simulation-aided field setting \\nverification per fraction is required for field placement, measurement of tumor depth, tissues evaluation, \\nprogress, acute effect monitoring, and determination for therapeutic treatment delivery is appropriate.\\n
Custom Shielding Preamble Text Will Not Be Included With Simple Simulations (.......... X X Y Cm): A lead shield of 0.762 mm thickness is utilized to form a molded, custom shield with a
Ultrasound Used Text: .
Dimensions-X Axis In Cm: 1.2
Treatment Time / Fractionation (Optional- Include Units): .45 min
Field Size (Applicator): 2.5 cm
Day Of The Week Treatment Administered: Tuesday
Custom Shielding Afterword Text Will Not Be Included With Simple Simulations (X X Y Cm............): port to correlate with the lesion size, including treatment margin. The custom lead shield is adequate to accommodate the appropriate applicator and provide adequate shielding around the treatment site. Additional shielding (as noted below) is used to protect sensitive, normal tissues.
Total Number Of Fractions: 20
Use Automated Fraction Number And Cumulative Dosage?: No (Maintain Current Freetext Entry)
Simple Simulation Afterword Text Will Be Included With Simple Simulations (Indications............): The patient had a complete consultation regarding all applicable modalities for the treatment of their skin cancer and based on a variety of factors including the type of tumor, size, and location, the relevant medical history as well as local tissue factors, the functional status of the individual, the ability to perform necessary postoperative wound instructions and the need for simultaneous treatments as well as overall wound healing status, it was determined that the patient would begin radiation therapy treatment for skin cancer.  A full simulation and treatment device design was performed including the determination and formulation of appropriate simple and complex devices including lead shield of 0.762 mm thickness to form molded customized shielding to specifically correlate with the lesion size including treatment margin.  The custom lead shield is adequate to accommodate the appropriate applicator and provide adequate shielding around the treatment site.  The specific field applicator, shields, and devices both simple and complex as well as the specific patient setup is outlined below.  The patient was given a full consent for superficial radiation to both verbally and in writing and the full determination of patient's eligibility for treatment and selection is outlined on the patient eligibility and treatment selection form.  The specific superficial radiotherapy prescription was determined and was documented on the superficial radiotherapy prescription form.  A treatment calculation was also performed and documented on the treatment calculation form.  Based on the prescription, the patient was scheduled for a series of fractional treatments.
Information: Selecting Yes will display possible errors in your note based on the variables you have selected. This validation is only offered as a suggestion for you. PLEASE NOTE THAT THE VALIDATION TEXT WILL BE REMOVED WHEN YOU FINALIZE YOUR NOTE. IF YOU WANT TO FAX A PRELIMINARY NOTE YOU WILL NEED TO TOGGLE THIS TO 'NO' IF YOU DO NOT WANT IT IN YOUR FAXED NOTE.
Treatment Margins In Cm: 0.5
Total Dose (Optional-Please Include Units): 5553.00 cGy
Energy (Include Units): 100
Assessment: Appropriate reaction
Patient Positioning: Sitting
Radiation Units: cGy
Body Location Override (Optional): Nasal Supratip
Field Size (Applicator): 2.0 cm
Treatment Device Design After Initial Simulation Justification (Will Render If Bill For Treatment Devices = Yes): The patient is status post radiation simulation and is evaluated as to the use of additional devices for shielding and placement for radiation therapy.
Pathology Override (Pathology Will Render As Diagnosis Name If Left Blank): Primary Nodular Basal Cell Carcinoma
Time Dose Fractionation (Optional- Include Units If Applicable): 99
Dose Per Fractionation In Cgy (Optional): 277.65

## 2022-11-30 ENCOUNTER — APPOINTMENT (OUTPATIENT)
Dept: URBAN - METROPOLITAN AREA CLINIC 319 | Age: 82
Setting detail: DERMATOLOGY
End: 2022-12-02

## 2022-11-30 PROBLEM — C44.311 BASAL CELL CARCINOMA OF SKIN OF NOSE: Status: ACTIVE | Noted: 2022-11-30

## 2022-11-30 PROCEDURE — OTHER SUPERFICIAL RADIATION TREATMENT: OTHER

## 2022-11-30 PROCEDURE — OTHER TREATMENT REGIMEN: OTHER

## 2022-11-30 PROCEDURE — 77401 RADIATION TX DELIVERY SUPFC: CPT

## 2022-11-30 PROCEDURE — 77280 THER RAD SIMULAJ FIELD SMPL: CPT

## 2022-11-30 PROCEDURE — G6001 ECHO GUIDANCE RADIOTHERAPY: HCPCS | Mod: XU

## 2022-11-30 PROCEDURE — OTHER FOLLOW UP FOR NEXT VISIT: OTHER

## 2022-11-30 NOTE — PROCEDURE: SUPERFICIAL RADIATION TREATMENT
Fractions / Week Rx 2: 5
Validate Note Data (See Information Below): Yes
Total Number Of Fractions Rx 4: 15
Field Size (Applicator): 2.0 cm
Treatment Device Design After Initial Simulation Justification (Will Render If Bill For Treatment Devices = Yes): The patient is status post radiation simulation and is evaluated as to the use of additional devices for shielding and placement for radiation therapy.
Dose / Tx In Cgy (Optional): 277.65
Dimensions-X Axis In Cm: 1.2
Assessment: Appropriate reaction
Time Dose Fractionation (Optional- Include Units If Applicable): 99
Patient Positioning: Sitting
Radiation Units: cGy
Please Choose The Type Of Visit (Required): Treatment Visit: Show Treatment Variables
Custom Shielding Afterword Text Will Not Be Included With Simple Simulations (X X Y Cm............): port to correlate with the lesion size, including treatment margin. The custom lead shield is adequate to accommodate the appropriate applicator and provide adequate shielding around the treatment site. Additional shielding (as noted below) is used to protect sensitive, normal tissues.
Jonah For Simulation Without Treatment Device Design: No
Ultrasound Used Text: Ultrasound was utilized to place radiation therapy fields.
Functional Status: 0 (fully active)
Number Of Treatment Days: 1
Cumulative Dose In Cgy (Optional): 4442.40
Daily Fractionated Dose (Optional- Include Units): 277.65 cGy
Fractionation Number: 16
Field Size (Applicator): 2.5 cm
Initial Radiation Treatment Planning (Will Render If Bill Simulation = Yes): The patient had a complete consultation regarding all applicable modalities for the treatment of their skin cancer and based on a variety of factors including the type of tumor, size, and location, the relevant medical history as well as local tissue factors, the functional status of the individual, the ability to perform necessary postoperative wound instructions and the need for simultaneous treatments as well as overall wound healing status, it was determined that the patient would begin radiation therapy treatment for skin cancer.  A full simulation and treatment device design was performed including the determination and formulation of appropriate simple and complex devices including lead shield of 0.762 mm thickness to form molded customized shielding to specifically correlate with the lesion size including treatment margin.  The custom lead shield is adequate to accommodate the appropriate applicator and provide adequate shielding around the treatment site.  The specific field applicator, shields, and devices both simple and complex as well as the specific patient setup is outlined below.  The patient was given a full consent for superficial radiation to both verbally and in writing and the full determination of patient's eligibility for treatment and selection is outlined on the patient eligibility and treatment selection form.  The specific superficial radiotherapy prescription was determined and was documented on the superficial radiotherapy prescription form.  A treatment calculation was also performed and documented on the treatment calculation form.  Based on the prescription, the patient was scheduled for a series of fractional treatments.
Energy (Include Units): 100
Port Dimensions-Y Axis In Cm: 1.7
Energy (Optional-Please Include Units): 100 kV
Total Dose (Optional-Please Include Units): 5553.00 cGy
Custom Shielding Preamble Text Will Not Be Included With Simple Simulations (.......... X X Y Cm): A lead shield of 0.762 mm thickness is utilized to form a molded, custom shield with a
Ultrasound Used Text: .
Treatment Margins In Cm: 0.5
Computed Treatment Time In Min (Will Render The Same As Calculated Treatment Time If Left Blank): .45
Intro Statement (Will Not Render If Left Blank): The patient is undergoing superficial radiation therapy for skin cancer and presents for weekly \\nevaluation and management. Per protocol and as documented on the flow sheet, the patient was questioned \\perry to subjective redness, pruritus, pain, drainage, fatigue, or any other symptoms. Objectively, the radiation \\narea was evaluated with regards to erythema, atrophy, scale, crusting, erosion, ulceration, edema, purpura, \\ntenderness, warmth, drainage, and any other findings. The plan was extensively reviewed including dose and \\ndosing schedule. The simulation and clinical setup were also reviewed as were external \\nshields and based on this review the appropriateness and sufficiency of treatment was determined.\\nA high-frequency ultrasound image was acquired today for a two-dimensional evaluation of the tumor \\nvolume, depth, width, breadth, review of prior response to treatment, provide geometric accuracy of field \\nplacement, and determine whether to proceed with therapeutic delivery.\\n\\nHigh frequency ultrasound depth is 1.84 mm, which is - 0.21mm in difference from previous imaging.\\n\\nPer Dr. ROSE Arias, continued ultrasound guidance and therapeutic radiology simulation-aided field setting \\nverification per fraction is required for field placement, measurement of tumor depth, tissues evaluation, \\nprogress, acute effect monitoring, and determination for therapeutic treatment delivery is appropriate.\\n
Treatment Time / Fractionation (Optional- Include Units): .45 min
Day Of The Week Treatment Administered: Wednesday
Body Location Override (Optional): Nasal Supratip
Simple Simulation Preamble Text Will Be Included With Simple Simulations (.......... Indications): Simple simulation was performed today for the following reasons:
Pathology Override (Pathology Will Render As Diagnosis Name If Left Blank): Primary Nodular Basal Cell Carcinoma
Depth (Optional-Please Include Units): 2.86
Total Number Of Fractions: 20
Use Automated Fraction Number And Cumulative Dosage?: No (Maintain Current Freetext Entry)
Detail Level: Detailed
Information: Selecting Yes will display possible errors in your note based on the variables you have selected. This validation is only offered as a suggestion for you. PLEASE NOTE THAT THE VALIDATION TEXT WILL BE REMOVED WHEN YOU FINALIZE YOUR NOTE. IF YOU WANT TO FAX A PRELIMINARY NOTE YOU WILL NEED TO TOGGLE THIS TO 'NO' IF YOU DO NOT WANT IT IN YOUR FAXED NOTE.
Fractions / Week: 3

## 2022-11-30 NOTE — PROCEDURE: TREATMENT REGIMEN
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 Written informed consent has been previously obtained from this patient for this treatment. This \\nconsent is documented in the patient's chart. The patient gave verbal consent to continue treatment today. \\nThe patient was treated with a specific radiation dose and setup as prescribed by the provider listed on this \\nvisit note. A Radiation Therapist performed administration of radiation under the supervision of a provider. \\nThe treatment parameters and cumulative dose are indicated above. Prior to administering the radiation, the \\npatient underwent a verification therapeutic radiology simulation-aided field setting defining relevant normal \\nand abnormal target anatomy and acquiring images with a separate and distinct diagnostic high-frequency \\nultrasound to delineate tissues and determine whether to proceed with delivery of therapeutic, in addition to \\nretrieve data necessary to develop an optimal radiation treatment process for the patient. The field \\nplacement simulation documents any change seen in overall tumor volume documented in the patient's \\nrecord, allows the clinician to indicate any needed changes in the treatment plan and/or prescription, \\nprovides diagnostic evaluation as the basis for performing the therapeutic procedure, and clearly identifies \\nthe information needed to decide to proceed with the therapeutic procedure. This process includes \\nverification of the treatment port and proper treatment positioning. All treatment ports were \\nphotographed within electronic medical records. The patient's lead blocking along with gross tumor volume \\nand margin was confirmed. Considering superficial radiotherapy is clinical in setup, this requires the physician \\nand radiation therapist to clarify the location interest being treated against initial images, ultrasound, \\npathology, and patient anatomy. Care was taken to ensure snell treated were geometrically accurate and \\nproperly positioned using therapeutic radiology simulation-aided field setting verification per fraction. This \\nprocess is also utilized to determine if any prescription or setup changes are necessary. These steps are \\ntherefore medically necessary to ensure safe and effective administration of radiation. Ongoing therapeutic \\nradiology simulation-aided field setting verification is ordered throughout the course of therapy.\\n\\nA high-frequency ultrasound image was acquired today for a two-dimensional evaluation of the tumor \\nvolume, depth, width, breadth, review of prior response to treatment, provide geometric accuracy of field \\nplacement, and determine whether to proceed with therapeutic delivery.\\n\\nHigh frequency ultrasound depth is 2.04  mm , which is + 0.20 mm in difference from previous imaging.\\n\\nThe field placement and ultrasound imaging, per fraction, is separate and distinct from the initial simulation \\nand is an important task in providing safe administration of superficial radiation therapy. Physician evaluation \\nof the ultrasound information will be ongoing throughout the course of treatment and is deemed medically \\nnecessary to ensure the efficacy of treatment, whether to proceed with therapeutic delivery, and determine\\apollo necessary changes. Today's images were evaluated for determination of continuation of treatment with \\nthe current plan or with necessary changes as appropriate. According to the review of verification \\ntherapeutic radiology simulation-aided field setting and imaging, no change is required. \\nAdditionally, the use of ultrasound visualization and targeted assessment allows the patient to be able to see \\nher cancer progress, encouraging the patient to complete and maintain compliance through the full \\ncourse of prescribed radiotherapy. Per Dr. ROSE Arias, continued ultrasound guidance and therapeutic radiology \\nsimulation-aided field setting verification per fraction is required for field placement, measurement of tumor \\ndepth, tissues evaluation, progress, acute effect monitoring, and determination for therapeutic treatment \\ndelivery is appropriate. 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 Written informed consent has been previously obtained from this patient for this treatment. This \\nconsent is documented in the patient's chart. The patient gave verbal consent to continue treatment today. \\nThe patient was treated with a specific radiation dose and setup as prescribed by the provider listed on this \\nvisit note. A Radiation Therapist performed administration of radiation under the supervision of a provider. \\nThe treatment parameters and cumulative dose are indicated above. Prior to administering the radiation, the \\npatient underwent a verification therapeutic radiology simulation-aided field setting defining relevant normal \\nand abnormal target anatomy and acquiring images with a separate and distinct diagnostic high-frequency \\nultrasound to delineate tissues and determine whether to proceed with delivery of therapeutic, in addition to \\nretrieve data necessary to develop an optimal radiation treatment process for the patient. The field \\nplacement simulation documents any change seen in overall tumor volume documented in the patient's \\nrecord, allows the clinician to indicate any needed changes in the treatment plan and/or prescription, \\nprovides diagnostic evaluation as the basis for performing the therapeutic procedure, and clearly identifies \\nthe information needed to decide to proceed with the therapeutic procedure. This process includes \\nverification of the treatment port and proper treatment positioning. All treatment ports were \\nphotographed within electronic medical records. The patient's lead blocking along with gross tumor volume \\nand margin was confirmed. Considering superficial radiotherapy is clinical in setup, this requires the physician \\nand radiation therapist to clarify the location interest being treated against initial images, ultrasound, \\npathology, and patient anatomy. Care was taken to ensure snell treated were geometrically accurate and \\nproperly positioned using therapeutic radiology simulation-aided field setting verification per fraction. This \\nprocess is also utilized to determine if any prescription or setup changes are necessary. These steps are \\ntherefore medically necessary to ensure safe and effective administration of radiation. Ongoing therapeutic \\nradiology simulation-aided field setting verification is ordered throughout the course of therapy.\\n\\nA high-frequency ultrasound image was acquired today for a two-dimensional evaluation of the tumor \\nvolume, depth, width, breadth, review of prior response to treatment, provide geometric accuracy of field \\nplacement, and determine whether to proceed with therapeutic delivery.\\n\\nHigh frequency ultrasound depth is 2.04  mm , which is + 0.20 mm in difference from previous imaging.\\n\\nThe field placement and ultrasound imaging, per fraction, is separate and distinct from the initial simulation \\nand is an important task in providing safe administration of superficial radiation therapy. Physician evaluation \\nof the ultrasound information will be ongoing throughout the course of treatment and is deemed medically \\nnecessary to ensure the efficacy of treatment, whether to proceed with therapeutic delivery, and determine\\apollo necessary changes. Today's images were evaluated for determination of continuation of treatment with \\nthe current plan or with necessary changes as appropriate. According to the review of verification \\ntherapeutic radiology simulation-aided field setting and imaging, no change is required. \\nAdditionally, the use of ultrasound visualization and targeted assessment allows the patient to be able to see \\nher cancer progress, encouraging the patient to complete and maintain compliance through the full \\ncourse of prescribed radiotherapy. Per Dr. ROSE rAias, continued ultrasound guidance and therapeutic radiology \\nsimulation-aided field setting verification per fraction is required for field placement, measurement of tumor \\ndepth, tissues evaluation, progress, acute effect monitoring, and determination for therapeutic treatment \\ndelivery is appropriate.

## 2022-12-01 ENCOUNTER — APPOINTMENT (OUTPATIENT)
Dept: URBAN - METROPOLITAN AREA CLINIC 319 | Age: 82
Setting detail: DERMATOLOGY
End: 2022-12-06

## 2022-12-01 PROBLEM — C44.311 BASAL CELL CARCINOMA OF SKIN OF NOSE: Status: ACTIVE | Noted: 2022-12-01

## 2022-12-01 PROCEDURE — OTHER SUPERFICIAL RADIATION TREATMENT: OTHER

## 2022-12-01 PROCEDURE — OTHER FOLLOW UP FOR NEXT VISIT: OTHER

## 2022-12-01 PROCEDURE — 77401 RADIATION TX DELIVERY SUPFC: CPT

## 2022-12-01 PROCEDURE — G6001 ECHO GUIDANCE RADIOTHERAPY: HCPCS | Mod: XU

## 2022-12-01 PROCEDURE — OTHER TREATMENT REGIMEN: OTHER

## 2022-12-01 PROCEDURE — 77280 THER RAD SIMULAJ FIELD SMPL: CPT

## 2022-12-01 NOTE — PROCEDURE: SUPERFICIAL RADIATION TREATMENT
Fractions / Week Rx 5: 5
Include Rx 3 When Rendering Additional Prescriptions: No
Bill For Physics Consultation: Yes
Please Choose The Type Of Visit (Required): Treatment Visit: Show Treatment Variables
Custom Shielding Afterword Text Will Not Be Included With Simple Simulations (X X Y Cm............): port to correlate with the lesion size, including treatment margin. The custom lead shield is adequate to accommodate the appropriate applicator and provide adequate shielding around the treatment site. Additional shielding (as noted below) is used to protect sensitive, normal tissues.
Use Automated Fraction Number And Cumulative Dosage?: No (Maintain Current Freetext Entry)
Total Number Of Fractions: 20
Detail Level: Detailed
Fractionation Number (Evaluation): 15
Simple Simulation Afterword Text Will Be Included With Simple Simulations (Indications............): The patient had a complete consultation regarding all applicable modalities for the treatment of their skin cancer and based on a variety of factors including the type of tumor, size, and location, the relevant medical history as well as local tissue factors, the functional status of the individual, the ability to perform necessary postoperative wound instructions and the need for simultaneous treatments as well as overall wound healing status, it was determined that the patient would begin radiation therapy treatment for skin cancer.  A full simulation and treatment device design was performed including the determination and formulation of appropriate simple and complex devices including lead shield of 0.762 mm thickness to form molded customized shielding to specifically correlate with the lesion size including treatment margin.  The custom lead shield is adequate to accommodate the appropriate applicator and provide adequate shielding around the treatment site.  The specific field applicator, shields, and devices both simple and complex as well as the specific patient setup is outlined below.  The patient was given a full consent for superficial radiation to both verbally and in writing and the full determination of patient's eligibility for treatment and selection is outlined on the patient eligibility and treatment selection form.  The specific superficial radiotherapy prescription was determined and was documented on the superficial radiotherapy prescription form.  A treatment calculation was also performed and documented on the treatment calculation form.  Based on the prescription, the patient was scheduled for a series of fractional treatments.
Treatment Time In Min (Optional): .45
Field Number: 1
Fractionation Number: 17
Field Size (Applicator): 2.5 cm
Total Dose (Optional-Please Include Units): 5553.00 cGy
Energy (Include Units): 100
Information: Selecting Yes will display possible errors in your note based on the variables you have selected. This validation is only offered as a suggestion for you. PLEASE NOTE THAT THE VALIDATION TEXT WILL BE REMOVED WHEN YOU FINALIZE YOUR NOTE. IF YOU WANT TO FAX A PRELIMINARY NOTE YOU WILL NEED TO TOGGLE THIS TO 'NO' IF YOU DO NOT WANT IT IN YOUR FAXED NOTE.
Energy (Optional-Please Include Units): 100 kV
Assessment: Appropriate reaction
Radiation Units: cGy
Patient Positioning: Sitting
Field Size (Applicator): 2.0 cm
Treatment Device Design After Initial Simulation Justification (Will Render If Bill For Treatment Devices = Yes): The patient is status post radiation simulation and is evaluated as to the use of additional devices for shielding and placement for radiation therapy.
Dimensions-X Axis In Cm: 1.2
Dose Per Fractionation In Cgy (Optional): 277.65
Time Dose Fractionation (Optional- Include Units If Applicable): 99
Port Dimensions-X Axis In Cm: 1.7
Simple Simulation Preamble Text Will Be Included With Simple Simulations (.......... Indications): Simple simulation was performed today for the following reasons:
Pathology Override (Pathology Will Render As Diagnosis Name If Left Blank): Primary Nodular Basal Cell Carcinoma
Depth (Optional-Please Include Units): 2.86
Functional Status: 0 (fully active)
Ultrasound Used Text: Ultrasound was utilized to place radiation therapy fields.
Daily Fractionated Dose (Optional- Include Units): 277.65 cGy
Cumulative Dose In Cgy (Optional): 4720.05
Fractions / Week: 3
Intro Statement (Will Not Render If Left Blank): The patient is undergoing superficial radiation therapy for skin cancer and presents for weekly \\nevaluation and management. Per protocol and as documented on the flow sheet, the patient was questioned \\perry to subjective redness, pruritus, pain, drainage, fatigue, or any other symptoms. Objectively, the radiation \\narea was evaluated with regards to erythema, atrophy, scale, crusting, erosion, ulceration, edema, purpura, \\ntenderness, warmth, drainage, and any other findings. The plan was extensively reviewed including dose and \\ndosing schedule. The simulation and clinical setup were also reviewed as were external \\nshields and based on this review the appropriateness and sufficiency of treatment was determined.\\nA high-frequency ultrasound image was acquired today for a two-dimensional evaluation of the tumor \\nvolume, depth, width, breadth, review of prior response to treatment, provide geometric accuracy of field \\nplacement, and determine whether to proceed with therapeutic delivery.\\n\\nHigh frequency ultrasound depth is 1.84 mm, which is - 0.21mm in difference from previous imaging.\\n\\nPer Dr. ROSE Arias, continued ultrasound guidance and therapeutic radiology simulation-aided field setting \\nverification per fraction is required for field placement, measurement of tumor depth, tissues evaluation, \\nprogress, acute effect monitoring, and determination for therapeutic treatment delivery is appropriate.\\n
Ultrasound Used Text: .
Custom Shielding Preamble Text Will Not Be Included With Simple Simulations (.......... X X Y Cm): A lead shield of 0.762 mm thickness is utilized to form a molded, custom shield with a
Treatment Margins In Cm: 0.5
Treatment Time / Fractionation (Optional- Include Units): .45 min
Day Of The Week Treatment Administered: Thursday
Body Location Override (Optional): Nasal Supratip

## 2022-12-01 NOTE — PROCEDURE: TREATMENT REGIMEN
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 Written informed consent has been previously obtained from this patient for this treatment. This \\nconsent is documented in the patient's chart. The patient gave verbal consent to continue treatment today. \\nThe patient was treated with a specific radiation dose and setup as prescribed by the provider listed on this \\nvisit note. A Radiation Therapist performed administration of radiation under the supervision of a provider. \\nThe treatment parameters and cumulative dose are indicated above. Prior to administering the radiation, the \\npatient underwent a verification therapeutic radiology simulation-aided field setting defining relevant normal \\nand abnormal target anatomy and acquiring images with a separate and distinct diagnostic high-frequency \\nultrasound to delineate tissues and determine whether to proceed with delivery of therapeutic, in addition to \\nretrieve data necessary to develop an optimal radiation treatment process for the patient. The field \\nplacement simulation documents any change seen in overall tumor volume documented in the patient's \\nrecord, allows the clinician to indicate any needed changes in the treatment plan and/or prescription, \\nprovides diagnostic evaluation as the basis for performing the therapeutic procedure, and clearly identifies \\nthe information needed to decide to proceed with the therapeutic procedure. This process includes \\nverification of the treatment port and proper treatment positioning. All treatment ports were \\nphotographed within electronic medical records. The patient's lead blocking along with gross tumor volume \\nand margin was confirmed. Considering superficial radiotherapy is clinical in setup, this requires the physician \\nand radiation therapist to clarify the location interest being treated against initial images, ultrasound, \\npathology, and patient anatomy. Care was taken to ensure snell treated were geometrically accurate and \\nproperly positioned using therapeutic radiology simulation-aided field setting verification per fraction. This \\nprocess is also utilized to determine if any prescription or setup changes are necessary. These steps are \\ntherefore medically necessary to ensure safe and effective administration of radiation. Ongoing therapeutic \\nradiology simulation-aided field setting verification is ordered throughout the course of therapy.\\n\\nA high-frequency ultrasound image was acquired today for a two-dimensional evaluation of the tumor \\nvolume, depth, width, breadth, review of prior response to treatment, provide geometric accuracy of field \\nplacement, and determine whether to proceed with therapeutic delivery.\\n\\nHigh frequency ultrasound depth is 1.79  mm , which is - 0.25 mm in difference from previous imaging.\\n\\nThe field placement and ultrasound imaging, per fraction, is separate and distinct from the initial simulation \\nand is an important task in providing safe administration of superficial radiation therapy. Physician evaluation \\nof the ultrasound information will be ongoing throughout the course of treatment and is deemed medically \\nnecessary to ensure the efficacy of treatment, whether to proceed with therapeutic delivery, and determine\\apollo necessary changes. Today's images were evaluated for determination of continuation of treatment with \\nthe current plan or with necessary changes as appropriate. According to the review of verification \\ntherapeutic radiology simulation-aided field setting and imaging, no change is required. \\nAdditionally, the use of ultrasound visualization and targeted assessment allows the patient to be able to see \\nher cancer progress, encouraging the patient to complete and maintain compliance through the full \\ncourse of prescribed radiotherapy. Per Dr. ROSE Arias, continued ultrasound guidance and therapeutic radiology \\nsimulation-aided field setting verification per fraction is required for field placement, measurement of tumor \\ndepth, tissues evaluation, progress, acute effect monitoring, and determination for therapeutic treatment \\ndelivery is appropriate.
Detail Level: Detailed

## 2022-12-05 ENCOUNTER — APPOINTMENT (OUTPATIENT)
Dept: URBAN - METROPOLITAN AREA CLINIC 319 | Age: 82
Setting detail: DERMATOLOGY
End: 2022-12-06

## 2022-12-05 PROBLEM — C44.311 BASAL CELL CARCINOMA OF SKIN OF NOSE: Status: ACTIVE | Noted: 2022-12-05

## 2022-12-05 PROCEDURE — 77401 RADIATION TX DELIVERY SUPFC: CPT

## 2022-12-05 PROCEDURE — G6001 ECHO GUIDANCE RADIOTHERAPY: HCPCS | Mod: XU

## 2022-12-05 PROCEDURE — OTHER FOLLOW UP FOR NEXT VISIT: OTHER

## 2022-12-05 PROCEDURE — OTHER TREATMENT REGIMEN: OTHER

## 2022-12-05 PROCEDURE — OTHER SUPERFICIAL RADIATION TREATMENT: OTHER

## 2022-12-05 PROCEDURE — 77280 THER RAD SIMULAJ FIELD SMPL: CPT

## 2022-12-05 NOTE — PROCEDURE: TREATMENT REGIMEN
Detail Level: Detailed
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 Written informed consent has been previously obtained from this patient for this treatment. This \\nconsent is documented in the patient's chart. The patient gave verbal consent to continue treatment today. \\nThe patient was treated with a specific radiation dose and setup as prescribed by the provider listed on this \\nvisit note. A Radiation Therapist performed administration of radiation under the supervision of a provider. \\nThe treatment parameters and cumulative dose are indicated above. Prior to administering the radiation, the \\npatient underwent a verification therapeutic radiology simulation-aided field setting defining relevant normal \\nand abnormal target anatomy and acquiring images with a separate and distinct diagnostic high-frequency \\nultrasound to delineate tissues and determine whether to proceed with delivery of therapeutic, in addition to \\nretrieve data necessary to develop an optimal radiation treatment process for the patient. The field \\nplacement simulation documents any change seen in overall tumor volume documented in the patient's \\nrecord, allows the clinician to indicate any needed changes in the treatment plan and/or prescription, \\nprovides diagnostic evaluation as the basis for performing the therapeutic procedure, and clearly identifies \\nthe information needed to decide to proceed with the therapeutic procedure. This process includes \\nverification of the treatment port and proper treatment positioning. All treatment ports were \\nphotographed within electronic medical records. The patient's lead blocking along with gross tumor volume \\nand margin was confirmed. Considering superficial radiotherapy is clinical in setup, this requires the physician \\nand radiation therapist to clarify the location interest being treated against initial images, ultrasound, \\npathology, and patient anatomy. Care was taken to ensure snell treated were geometrically accurate and \\nproperly positioned using therapeutic radiology simulation-aided field setting verification per fraction. This \\nprocess is also utilized to determine if any prescription or setup changes are necessary. These steps are \\ntherefore medically necessary to ensure safe and effective administration of radiation. Ongoing therapeutic \\nradiology simulation-aided field setting verification is ordered throughout the course of therapy.\\n\\nA high-frequency ultrasound image was acquired today for a two-dimensional evaluation of the tumor \\nvolume, depth, width, breadth, review of prior response to treatment, provide geometric accuracy of field \\nplacement, and determine whether to proceed with therapeutic delivery.\\n\\nHigh frequency ultrasound depth is 1.83  mm , which is + 0.04 mm in difference from previous imaging.\\n\\nThe field placement and ultrasound imaging, per fraction, is separate and distinct from the initial simulation \\nand is an important task in providing safe administration of superficial radiation therapy. Physician evaluation \\nof the ultrasound information will be ongoing throughout the course of treatment and is deemed medically \\nnecessary to ensure the efficacy of treatment, whether to proceed with therapeutic delivery, and determine\\apollo necessary changes. Today's images were evaluated for determination of continuation of treatment with \\nthe current plan or with necessary changes as appropriate. According to the review of verification \\ntherapeutic radiology simulation-aided field setting and imaging, no change is required. \\nAdditionally, the use of ultrasound visualization and targeted assessment allows the patient to be able to see \\nher cancer progress, encouraging the patient to complete and maintain compliance through the full \\ncourse of prescribed radiotherapy. Per Dr. ROSE Arias, continued ultrasound guidance and therapeutic radiology \\nsimulation-aided field setting verification per fraction is required for field placement, measurement of tumor \\ndepth, tissues evaluation, progress, acute effect monitoring, and determination for therapeutic treatment \\ndelivery is appropriate.

## 2022-12-05 NOTE — PROCEDURE: SUPERFICIAL RADIATION TREATMENT
Bill For Ultrasound Evaluation (): Yes
Include Rx 5 When Rendering Additional Prescriptions: No
Fractions / Week Rx 2: 5
Ultrasound Used Text: .
Initial Radiation Treatment Planning (Will Render If Bill Simulation = Yes): The patient had a complete consultation regarding all applicable modalities for the treatment of their skin cancer and based on a variety of factors including the type of tumor, size, and location, the relevant medical history as well as local tissue factors, the functional status of the individual, the ability to perform necessary postoperative wound instructions and the need for simultaneous treatments as well as overall wound healing status, it was determined that the patient would begin radiation therapy treatment for skin cancer.  A full simulation and treatment device design was performed including the determination and formulation of appropriate simple and complex devices including lead shield of 0.762 mm thickness to form molded customized shielding to specifically correlate with the lesion size including treatment margin.  The custom lead shield is adequate to accommodate the appropriate applicator and provide adequate shielding around the treatment site.  The specific field applicator, shields, and devices both simple and complex as well as the specific patient setup is outlined below.  The patient was given a full consent for superficial radiation to both verbally and in writing and the full determination of patient's eligibility for treatment and selection is outlined on the patient eligibility and treatment selection form.  The specific superficial radiotherapy prescription was determined and was documented on the superficial radiotherapy prescription form.  A treatment calculation was also performed and documented on the treatment calculation form.  Based on the prescription, the patient was scheduled for a series of fractional treatments.
Intro Statement (Will Not Render If Left Blank): The patient is undergoing superficial radiation therapy for skin cancer and presents for weekly \\nevaluation and management. Per protocol and as documented on the flow sheet, the patient was questioned \\perry to subjective redness, pruritus, pain, drainage, fatigue, or any other symptoms. Objectively, the radiation \\narea was evaluated with regards to erythema, atrophy, scale, crusting, erosion, ulceration, edema, purpura, \\ntenderness, warmth, drainage, and any other findings. The plan was extensively reviewed including dose and \\ndosing schedule. The simulation and clinical setup were also reviewed as were external \\nshields and based on this review the appropriateness and sufficiency of treatment was determined.\\nA high-frequency ultrasound image was acquired today for a two-dimensional evaluation of the tumor \\nvolume, depth, width, breadth, review of prior response to treatment, provide geometric accuracy of field \\nplacement, and determine whether to proceed with therapeutic delivery.\\n\\nHigh frequency ultrasound depth is 1.84 mm, which is - 0.21mm in difference from previous imaging.\\n\\nPer Dr. ROSE Arias, continued ultrasound guidance and therapeutic radiology simulation-aided field setting \\nverification per fraction is required for field placement, measurement of tumor depth, tissues evaluation, \\nprogress, acute effect monitoring, and determination for therapeutic treatment delivery is appropriate.\\n
Computed Treatment Time In Min (Will Render The Same As Calculated Treatment Time If Left Blank): .45
Number Of Days Off Treatment: 1
Treatment Time / Fractionation (Optional- Include Units): .45 min
Day Of The Week Treatment Administered: Monday
Assessment: Appropriate reaction
Radiation Units: cGy
Dimensions-X Axis In Cm: 1.2
Treatment Device Design After Initial Simulation Justification (Will Render If Bill For Treatment Devices = Yes): The patient is status post radiation simulation and is evaluated as to the use of additional devices for shielding and placement for radiation therapy.
Field Size (Applicator): 2.5 cm
Ssd In Cm (Optional): 15
Depth (Optional-Please Include Units): 2.86
Pathology Override (Pathology Will Render As Diagnosis Name If Left Blank): Primary Nodular Basal Cell Carcinoma
Simple Simulation Preamble Text Will Be Included With Simple Simulations (.......... Indications): Simple simulation was performed today for the following reasons:
Total Number Of Fractions: 20
Use Automated Fraction Number And Cumulative Dosage?: No (Maintain Current Freetext Entry)
Port Dimensions-X Axis In Cm: 1.7
Energy (Optional-Please Include Units): 100 kV
Fractions / Week: 3
Energy (Include Units): 100
Information: Selecting Yes will display possible errors in your note based on the variables you have selected. This validation is only offered as a suggestion for you. PLEASE NOTE THAT THE VALIDATION TEXT WILL BE REMOVED WHEN YOU FINALIZE YOUR NOTE. IF YOU WANT TO FAX A PRELIMINARY NOTE YOU WILL NEED TO TOGGLE THIS TO 'NO' IF YOU DO NOT WANT IT IN YOUR FAXED NOTE.
Total Dose (Optional-Please Include Units): 5553.00 cGy
Treatment Margins In Cm: 0.5
Functional Status: 0 (fully active)
Field Size (Applicator): 2.0 cm
Custom Shielding Preamble Text Will Not Be Included With Simple Simulations (.......... X X Y Cm): A lead shield of 0.762 mm thickness is utilized to form a molded, custom shield with a
Time Dose Fractionation (Optional- Include Units If Applicable): 99
Dose Per Fractionation In Cgy (Optional): 277.65
Patient Positioning: Sitting
Body Location Override (Optional): Nasal Supratip
Please Choose The Type Of Visit (Required): Treatment Visit: Show Treatment Variables
Ultrasound Used Text: Ultrasound was utilized to place radiation therapy fields.
Cumulative Dose In Cgy (Optional): 4997.70
Custom Shielding Afterword Text Will Not Be Included With Simple Simulations (X X Y Cm............): port to correlate with the lesion size, including treatment margin. The custom lead shield is adequate to accommodate the appropriate applicator and provide adequate shielding around the treatment site. Additional shielding (as noted below) is used to protect sensitive, normal tissues.
Daily Fractionated Dose (Optional- Include Units): 277.65 cGy
Detail Level: Detailed
Fractionation Number: 18

## 2022-12-06 ENCOUNTER — APPOINTMENT (OUTPATIENT)
Dept: URBAN - METROPOLITAN AREA CLINIC 319 | Age: 82
Setting detail: DERMATOLOGY
End: 2022-12-09

## 2022-12-06 PROBLEM — C44.311 BASAL CELL CARCINOMA OF SKIN OF NOSE: Status: ACTIVE | Noted: 2022-12-06

## 2022-12-06 PROCEDURE — OTHER SUPERFICIAL RADIATION TREATMENT: OTHER

## 2022-12-06 PROCEDURE — OTHER FOLLOW UP FOR NEXT VISIT: OTHER

## 2022-12-06 PROCEDURE — 77401 RADIATION TX DELIVERY SUPFC: CPT

## 2022-12-06 PROCEDURE — G6001 ECHO GUIDANCE RADIOTHERAPY: HCPCS | Mod: XU

## 2022-12-06 PROCEDURE — OTHER TREATMENT REGIMEN: OTHER

## 2022-12-06 PROCEDURE — 77280 THER RAD SIMULAJ FIELD SMPL: CPT

## 2022-12-06 NOTE — PROCEDURE: TREATMENT REGIMEN
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 Written informed consent has been previously obtained from this patient for this treatment. This \\nconsent is documented in the patient's chart. The patient gave verbal consent to continue treatment today. \\nThe patient was treated with a specific radiation dose and setup as prescribed by the provider listed on this \\nvisit note. A Radiation Therapist performed administration of radiation under the supervision of a provider. \\nThe treatment parameters and cumulative dose are indicated above. Prior to administering the radiation, the \\npatient underwent a verification therapeutic radiology simulation-aided field setting defining relevant normal \\nand abnormal target anatomy and acquiring images with a separate and distinct diagnostic high-frequency \\nultrasound to delineate tissues and determine whether to proceed with delivery of therapeutic, in addition to \\nretrieve data necessary to develop an optimal radiation treatment process for the patient. The field \\nplacement simulation documents any change seen in overall tumor volume documented in the patient's \\nrecord, allows the clinician to indicate any needed changes in the treatment plan and/or prescription, \\nprovides diagnostic evaluation as the basis for performing the therapeutic procedure, and clearly identifies \\nthe information needed to decide to proceed with the therapeutic procedure. This process includes \\nverification of the treatment port and proper treatment positioning. All treatment ports were \\nphotographed within electronic medical records. The patient's lead blocking along with gross tumor volume \\nand margin was confirmed. Considering superficial radiotherapy is clinical in setup, this requires the physician \\nand radiation therapist to clarify the location interest being treated against initial images, ultrasound, \\npathology, and patient anatomy. Care was taken to ensure snell treated were geometrically accurate and \\nproperly positioned using therapeutic radiology simulation-aided field setting verification per fraction. This \\nprocess is also utilized to determine if any prescription or setup changes are necessary. These steps are \\ntherefore medically necessary to ensure safe and effective administration of radiation. Ongoing therapeutic \\nradiology simulation-aided field setting verification is ordered throughout the course of therapy.\\n\\nA high-frequency ultrasound image was acquired today for a two-dimensional evaluation of the tumor \\nvolume, depth, width, breadth, review of prior response to treatment, provide geometric accuracy of field \\nplacement, and determine whether to proceed with therapeutic delivery.\\n\\nHigh frequency ultrasound depth is 2.11 mm , which is + 0.18 mm in difference from previous imaging.\\n\\nThe field placement and ultrasound imaging, per fraction, is separate and distinct from the initial simulation \\nand is an important task in providing safe administration of superficial radiation therapy. Physician evaluation \\nof the ultrasound information will be ongoing throughout the course of treatment and is deemed medically \\nnecessary to ensure the efficacy of treatment, whether to proceed with therapeutic delivery, and determine\\apollo necessary changes. Today's images were evaluated for determination of continuation of treatment with \\nthe current plan or with necessary changes as appropriate. According to the review of verification \\ntherapeutic radiology simulation-aided field setting and imaging, no change is required. \\nAdditionally, the use of ultrasound visualization and targeted assessment allows the patient to be able to see \\nher cancer progress, encouraging the patient to complete and maintain compliance through the full \\ncourse of prescribed radiotherapy. Per Dr. ROSE Arias, continued ultrasound guidance and therapeutic radiology \\nsimulation-aided field setting verification per fraction is required for field placement, measurement of tumor \\ndepth, tissues evaluation, progress, acute effect monitoring, and determination for therapeutic treatment \\ndelivery is appropriate.
Detail Level: Detailed

## 2022-12-06 NOTE — PROCEDURE: SUPERFICIAL RADIATION TREATMENT
Include Rx 3 When Rendering Additional Prescriptions: No
Cumulative Dose In Cgy (Optional): 5275.35
Daily Fractionated Dose (Optional- Include Units): 277.65 cGy
Information: Selecting Yes will display possible errors in your note based on the variables you have selected. This validation is only offered as a suggestion for you. PLEASE NOTE THAT THE VALIDATION TEXT WILL BE REMOVED WHEN YOU FINALIZE YOUR NOTE. IF YOU WANT TO FAX A PRELIMINARY NOTE YOU WILL NEED TO TOGGLE THIS TO 'NO' IF YOU DO NOT WANT IT IN YOUR FAXED NOTE.
Simple Simulation Afterword Text Will Be Included With Simple Simulations (Indications............): The patient had a complete consultation regarding all applicable modalities for the treatment of their skin cancer and based on a variety of factors including the type of tumor, size, and location, the relevant medical history as well as local tissue factors, the functional status of the individual, the ability to perform necessary postoperative wound instructions and the need for simultaneous treatments as well as overall wound healing status, it was determined that the patient would begin radiation therapy treatment for skin cancer.  A full simulation and treatment device design was performed including the determination and formulation of appropriate simple and complex devices including lead shield of 0.762 mm thickness to form molded customized shielding to specifically correlate with the lesion size including treatment margin.  The custom lead shield is adequate to accommodate the appropriate applicator and provide adequate shielding around the treatment site.  The specific field applicator, shields, and devices both simple and complex as well as the specific patient setup is outlined below.  The patient was given a full consent for superficial radiation to both verbally and in writing and the full determination of patient's eligibility for treatment and selection is outlined on the patient eligibility and treatment selection form.  The specific superficial radiotherapy prescription was determined and was documented on the superficial radiotherapy prescription form.  A treatment calculation was also performed and documented on the treatment calculation form.  Based on the prescription, the patient was scheduled for a series of fractional treatments.
Bill For Radiation Treatment: Yes
Please Choose The Type Of Visit (Required): Treatment Visit: Show Treatment Variables
Energy (Optional-Please Include Units): 100 kV
Fractionation Number: 19
Total Number Of Fractions Rx 4: 15
Detail Level: Detailed
Total Dose (Optional-Please Include Units): 5553.00 cGy
Energy (Include Units): 100
Custom Shielding Preamble Text Will Not Be Included With Simple Simulations (.......... X X Y Cm): A lead shield of 0.762 mm thickness is utilized to form a molded, custom shield with a
Ultrasound Used Text: .
Fractions / Week Rx 2: 5
Treatment Time / Fractionation (Optional- Include Units): .45 min
Field Size (Applicator): 2.0 cm
Treatment Device Design After Initial Simulation Justification (Will Render If Bill For Treatment Devices = Yes): The patient is status post radiation simulation and is evaluated as to the use of additional devices for shielding and placement for radiation therapy.
Field Size (Applicator): 2.5 cm
Assessment: Appropriate reaction
Dose Per Fractionation In Cgy (Optional): 277.65
Radiation Units: cGy
Prescription Used: 1
Depth (Optional-Please Include Units): 2.86
Custom Shielding Afterword Text Will Not Be Included With Simple Simulations (X X Y Cm............): port to correlate with the lesion size, including treatment margin. The custom lead shield is adequate to accommodate the appropriate applicator and provide adequate shielding around the treatment site. Additional shielding (as noted below) is used to protect sensitive, normal tissues.
Dimensions-X Axis In Cm: 1.2
Port Dimensions-X Axis In Cm: 1.7
Use Automated Fraction Number And Cumulative Dosage?: No (Maintain Current Freetext Entry)
Ultrasound Used Text: Ultrasound was utilized to place radiation therapy fields.
Functional Status: 0 (fully active)
Total Number Of Fractions: 20
Pathology Override (Pathology Will Render As Diagnosis Name If Left Blank): Primary Nodular Basal Cell Carcinoma
Treatment Time In Min (Optional): .45
Fractions / Week: 3
Intro Statement (Will Not Render If Left Blank): The patient is undergoing superficial radiation therapy for skin cancer and presents for weekly \\nevaluation and management. Per protocol and as documented on the flow sheet, the patient was questioned \\perry to subjective redness, pruritus, pain, drainage, fatigue, or any other symptoms. Objectively, the radiation \\narea was evaluated with regards to erythema, atrophy, scale, crusting, erosion, ulceration, edema, purpura, \\ntenderness, warmth, drainage, and any other findings. The plan was extensively reviewed including dose and \\ndosing schedule. The simulation and clinical setup were also reviewed as were external \\nshields and based on this review the appropriateness and sufficiency of treatment was determined.\\nA high-frequency ultrasound image was acquired today for a two-dimensional evaluation of the tumor \\nvolume, depth, width, breadth, review of prior response to treatment, provide geometric accuracy of field \\nplacement, and determine whether to proceed with therapeutic delivery.\\n\\nHigh frequency ultrasound depth is 1.84 mm, which is - 0.21mm in difference from previous imaging.\\n\\nPer Dr. ROSE Arias, continued ultrasound guidance and therapeutic radiology simulation-aided field setting \\nverification per fraction is required for field placement, measurement of tumor depth, tissues evaluation, \\nprogress, acute effect monitoring, and determination for therapeutic treatment delivery is appropriate.\\n
Patient Positioning: Sitting
Day Of The Week Treatment Administered: Tuesday
Simple Simulation Preamble Text Will Be Included With Simple Simulations (.......... Indications): Simple simulation was performed today for the following reasons:
Time Dose Fractionation (Optional- Include Units If Applicable): 99
Treatment Margins In Cm: 0.5
Body Location Override (Optional): Nasal Supratip

## 2022-12-08 ENCOUNTER — APPOINTMENT (OUTPATIENT)
Dept: URBAN - METROPOLITAN AREA CLINIC 319 | Age: 82
Setting detail: DERMATOLOGY
End: 2022-12-09

## 2022-12-08 PROBLEM — C44.311 BASAL CELL CARCINOMA OF SKIN OF NOSE: Status: ACTIVE | Noted: 2022-12-08

## 2022-12-08 PROCEDURE — 77401 RADIATION TX DELIVERY SUPFC: CPT

## 2022-12-08 PROCEDURE — OTHER SUPERFICIAL RADIATION TREATMENT: OTHER

## 2022-12-08 PROCEDURE — G6001 ECHO GUIDANCE RADIOTHERAPY: HCPCS | Mod: XU

## 2022-12-08 PROCEDURE — OTHER FOLLOW UP FOR NEXT VISIT: OTHER

## 2022-12-08 PROCEDURE — 77427 RADIATION TX MANAGEMENT X5: CPT

## 2022-12-08 PROCEDURE — OTHER TREATMENT REGIMEN: OTHER

## 2022-12-08 NOTE — PROCEDURE: SUPERFICIAL RADIATION TREATMENT
Fractions / Week Rx 3: 5
Bill And Render Text From Evaluation And Management Tab (Will Bill [id number]): Yes
Radiation Units: cGy
Number Of Treatment Days: 1
Add Physics Consultation: No
Field Size (Applicator): 2.0 cm
Treatment Margins In Cm: 0.5
Treatment Device Design After Initial Simulation Justification (Will Render If Bill For Treatment Devices = Yes): The patient is status post radiation simulation and is evaluated as to the use of additional devices for shielding and placement for radiation therapy.
Dose / Tx In Cgy (Optional): 277.65
Time Dose Fractionation (Optional- Include Units If Applicable): 99
Port Dimensions-X Axis In Cm: 1.7
Body Location Override (Optional): Nasal Supratip
Custom Shielding Afterword Text Will Not Be Included With Simple Simulations (X X Y Cm............): port to correlate with the lesion size, including treatment margin. The custom lead shield is adequate to accommodate the appropriate applicator and provide adequate shielding around the treatment site. Additional shielding (as noted below) is used to protect sensitive, normal tissues.
Total Number Of Fractions Rx 2: 15
Intro Statement (Will Not Render If Left Blank): The patient is undergoing superficial radiation therapy for skin cancer and presents for weekly \\nevaluation and management. Per protocol and as documented on the flow sheet, the patient was questioned \\perry to subjective redness, pruritus, pain, drainage, fatigue, or any other symptoms. Objectively, the radiation \\narea was evaluated with regards to erythema, atrophy, scale, crusting, erosion, ulceration, edema, purpura, \\ntenderness, warmth, drainage, and any other findings. The plan was extensively reviewed including dose and \\ndosing schedule. The simulation and clinical setup were also reviewed as were external \\nshields and based on this review the appropriateness and sufficiency of treatment was determined.\\nA high-frequency ultrasound image was acquired today for a two-dimensional evaluation of the tumor \\nvolume, depth, width, breadth, review of prior response to treatment, provide geometric accuracy of field \\nplacement, and determine whether to proceed with therapeutic delivery.\\n\\nHigh frequency ultrasound depth is 1.44 mm, which is - 0.67 mm in difference from previous imaging.\\n\\nPer Dr. ROSE Arias, continued ultrasound guidance and therapeutic radiology simulation-aided field setting \\nverification per fraction is required for field placement, measurement of tumor depth, tissues evaluation, \\nprogress, acute effect monitoring, and determination for therapeutic treatment delivery is appropriate.\\n
Ultrasound Used Text: Ultrasound was utilized to place radiation therapy fields.
Please Choose The Type Of Visit (Required): Treatment and Weekly Evaluation Visit: Show Treatment/Evaluation Variables
Cumulative Dose In Cgy (Optional): 5553.00
Daily Fractionated Dose (Optional- Include Units): 277.65 cGy
Fractionation Number: 20
Detail Level: Detailed
Field Size (Applicator): 2.5 cm
Initial Radiation Treatment Planning (Will Render If Bill Simulation = Yes): The patient had a complete consultation regarding all applicable modalities for the treatment of their skin cancer and based on a variety of factors including the type of tumor, size, and location, the relevant medical history as well as local tissue factors, the functional status of the individual, the ability to perform necessary postoperative wound instructions and the need for simultaneous treatments as well as overall wound healing status, it was determined that the patient would begin radiation therapy treatment for skin cancer.  A full simulation and treatment device design was performed including the determination and formulation of appropriate simple and complex devices including lead shield of 0.762 mm thickness to form molded customized shielding to specifically correlate with the lesion size including treatment margin.  The custom lead shield is adequate to accommodate the appropriate applicator and provide adequate shielding around the treatment site.  The specific field applicator, shields, and devices both simple and complex as well as the specific patient setup is outlined below.  The patient was given a full consent for superficial radiation to both verbally and in writing and the full determination of patient's eligibility for treatment and selection is outlined on the patient eligibility and treatment selection form.  The specific superficial radiotherapy prescription was determined and was documented on the superficial radiotherapy prescription form.  A treatment calculation was also performed and documented on the treatment calculation form.  Based on the prescription, the patient was scheduled for a series of fractional treatments.
Energy (Include Units): 100
Energy (Optional-Please Include Units): 100 kV
Total Dose (Optional-Please Include Units): 5553.00 cGy
Assessment: Appropriate reaction
Custom Shielding Preamble Text Will Not Be Included With Simple Simulations (.......... X X Y Cm): A lead shield of 0.762 mm thickness is utilized to form a molded, custom shield with a
Ultrasound Used Text: .
Computed Treatment Time In Min (Will Render The Same As Calculated Treatment Time If Left Blank): .45
Treatment Time / Fractionation (Optional- Include Units): .45 min
Patient Positioning: Sitting
Day Of The Week Treatment Administered: Thursday
Simple Simulation Preamble Text Will Be Included With Simple Simulations (.......... Indications): Simple simulation was performed today for the following reasons:
Dimensions-X Axis In Cm: 1.2
Functional Status: 0 (fully active)
Depth (Optional-Please Include Units): 2.86
Information: Selecting Yes will display possible errors in your note based on the variables you have selected. This validation is only offered as a suggestion for you. PLEASE NOTE THAT THE VALIDATION TEXT WILL BE REMOVED WHEN YOU FINALIZE YOUR NOTE. IF YOU WANT TO FAX A PRELIMINARY NOTE YOU WILL NEED TO TOGGLE THIS TO 'NO' IF YOU DO NOT WANT IT IN YOUR FAXED NOTE.
Pathology Override (Pathology Will Render As Diagnosis Name If Left Blank): Primary Nodular Basal Cell Carcinoma
Use Automated Fraction Number And Cumulative Dosage?: No (Maintain Current Freetext Entry)
Fractions / Week: 3

## 2022-12-20 NOTE — PROCEDURE: SUPERFICIAL RADIATION TREATMENT
Total Number Of Fractions Rx 4: 15
Use Automated Fraction Number And Cumulative Dosage?: No (Maintain Current Freetext Entry)
Field Number: 1
Bill And Render Text From Evaluation And Management Tab (Will Bill 12027): No
36.1
Treatment Device Design After Initial Simulation Justification (Will Render If Bill For Treatment Devices = Yes): The patient is status post radiation simulation and is evaluated as to the use of additional devices for shielding and placement for radiation therapy.
Assessment: Appropriate reaction
Was Ultrasound Performed Today?: Yes
Time Dose Fractionation (Optional- Include Units If Applicable): 99
Information: Selecting Yes will display possible errors in your note based on the variables you have selected. This validation is only offered as a suggestion for you. PLEASE NOTE THAT THE VALIDATION TEXT WILL BE REMOVED WHEN YOU FINALIZE YOUR NOTE. IF YOU WANT TO FAX A PRELIMINARY NOTE YOU WILL NEED TO TOGGLE THIS TO 'NO' IF YOU DO NOT WANT IT IN YOUR FAXED NOTE.
Functional Status: 0 (fully active)
Fractions / Week Rx 4: 5
Detail Level: Detailed
Treatment Margins In Cm: 0.5
Treatment Time In Min (Optional): .45
Radiation Units: cGy
Port Dimensions-Y Axis In Cm: 1.7
Custom Shielding Afterword Text Will Not Be Included With Simple Simulations (X X Y Cm............): port to correlate with the lesion size, including treatment margin. The custom lead shield is adequate to accommodate the appropriate applicator and provide adequate shielding around the treatment site. Additional shielding (as noted below) is used to protect sensitive, normal tissues.
Dimensions-X Axis In Cm: 1.2
Energy (Include Units): 100
Ultrasound Used Text: Ultrasound was utilized to place radiation therapy fields.
Daily Fractionated Dose (Optional- Include Units): 277.65 cGy
Total Dose (Optional-Please Include Units): 5553.00 cGy
Dose / Tx In Cgy (Optional): 277.65
Please Choose The Type Of Visit (Required): Treatment Visit: Show Treatment Variables
Initial Radiation Treatment Planning (Will Render If Bill Simulation = Yes): The patient had a complete consultation regarding all applicable modalities for the treatment of their skin cancer and based on a variety of factors including the type of tumor, size, and location, the relevant medical history as well as local tissue factors, the functional status of the individual, the ability to perform necessary postoperative wound instructions and the need for simultaneous treatments as well as overall wound healing status, it was determined that the patient would begin radiation therapy treatment for skin cancer.  A full simulation and treatment device design was performed including the determination and formulation of appropriate simple and complex devices including lead shield of 0.762 mm thickness to form molded customized shielding to specifically correlate with the lesion size including treatment margin.  The custom lead shield is adequate to accommodate the appropriate applicator and provide adequate shielding around the treatment site.  The specific field applicator, shields, and devices both simple and complex as well as the specific patient setup is outlined below.  The patient was given a full consent for superficial radiation to both verbally and in writing and the full determination of patient's eligibility for treatment and selection is outlined on the patient eligibility and treatment selection form.  The specific superficial radiotherapy prescription was determined and was documented on the superficial radiotherapy prescription form.  A treatment calculation was also performed and documented on the treatment calculation form.  Based on the prescription, the patient was scheduled for a series of fractional treatments.
Field Size (Applicator): 2.5 cm
Patient Positioning: Sitting
Energy (Optional-Please Include Units): 100 kV
Ultrasound Used Text: .
Custom Shielding Preamble Text Will Not Be Included With Simple Simulations (.......... X X Y Cm): A lead shield of 0.762 mm thickness is utilized to form a molded, custom shield with a
Treatment Time / Fractionation (Optional- Include Units): .45 min
Cumulative Dose In Cgy (Optional): 555.30
Field Size (Applicator): 2.0 cm
Simple Simulation Preamble Text Will Be Included With Simple Simulations (.......... Indications): Simple simulation was performed today for the following reasons:
Depth (Optional-Please Include Units): 2.86
Fractionation Number: 2
Total Number Of Fractions: 20
Day Of The Week Treatment Administered: Tuesday
Fractions / Week: 3
Intro Statement (Will Not Render If Left Blank): Plan: Per the request of Dr. Arias, the patient was seen today for Superficial Radiation Therapy planning requiring initial simulation \\tayler preparation for treatment of specific diseased sites. Simulation is necessary to determine the correct patient and treatment \\nportal positioning, to deliver safe and effective radiation therapy. A high-frequency ultrasound image was acquired prior to \\ntreatment today for two- dimensional evaluation of tumor volume and response to treatment throughout course of care, in \\naddition, to geometric accuracy of field placement. Physician evaluation of the ultrasound tumor depth, width, and breadth will \\nbe ongoing through the course of treatment and is deemed medically necessary ensuring efficacy of treatment. Today’s image \\nand setup were evaluated to determine the initiation of treatment with the current plan or necessary changes as appropriate. \\Eleazar appropriate custom blocking and treatment parameters were verified by the radiation therapist according to the initial \\nsimulation. Ultrasound image guidance and field placement prior to treatment delivery were performed. \\n\\nUltrasound depth is 1.40 mm \\n\\nPer Dr. Arias, continued daily ultrasound guidance and simulation are required for field placement, measurement of tumor depth,\\nwidth and breadth, progress, and edema monitoring. \\n\\nPer the request of Dr. Arias , continuing medical physics review as per radiotherapy standard of care post every 5th fraction for the patient, including assessment of treatment parameters,  of dose delivery, and review of patient treatment \\ndocumentation in support the provider, is ordered, ensuring efficacy and continued safe delivery of radiotherapy. Included \\tayler the physics check is a review of patient setup information, all pertinent simulation and treatment photograph(s) checks, \\nprescription, dose calculation verification, daily dose charted correctly, elapsed days and treatment days correctly charted, \\ncumulative dose correct, and review of any prescription changes. Continued medical physics review post every 5th fraction of \\nradiotherapy is requested by the provider for appropriate radiotherapy management and is deemed medically necessary and \\nstandard of care.

## 2023-02-21 ENCOUNTER — APPOINTMENT (OUTPATIENT)
Dept: URBAN - METROPOLITAN AREA CLINIC 319 | Age: 83
Setting detail: DERMATOLOGY
End: 2023-02-27

## 2023-02-21 PROBLEM — C44.91 BASAL CELL CARCINOMA OF SKIN, UNSPECIFIED: Status: ACTIVE | Noted: 2023-02-21

## 2023-02-21 PROCEDURE — G6001 ECHO GUIDANCE RADIOTHERAPY: HCPCS

## 2023-02-21 PROCEDURE — 99213 OFFICE O/P EST LOW 20 MIN: CPT

## 2023-03-15 ENCOUNTER — APPOINTMENT (OUTPATIENT)
Dept: URBAN - METROPOLITAN AREA CLINIC 319 | Age: 83
Setting detail: DERMATOLOGY
End: 2023-03-15

## 2023-03-15 DIAGNOSIS — L82.1 OTHER SEBORRHEIC KERATOSIS: ICD-10-CM

## 2023-03-15 DIAGNOSIS — L57.0 ACTINIC KERATOSIS: ICD-10-CM

## 2023-03-15 DIAGNOSIS — D18.0 HEMANGIOMA: ICD-10-CM

## 2023-03-15 DIAGNOSIS — Z12.83 ENCOUNTER FOR SCREENING FOR MALIGNANT NEOPLASM OF SKIN: ICD-10-CM

## 2023-03-15 DIAGNOSIS — D22 MELANOCYTIC NEVI: ICD-10-CM

## 2023-03-15 DIAGNOSIS — L82.0 INFLAMED SEBORRHEIC KERATOSIS: ICD-10-CM

## 2023-03-15 PROBLEM — D18.01 HEMANGIOMA OF SKIN AND SUBCUTANEOUS TISSUE: Status: ACTIVE | Noted: 2023-03-15

## 2023-03-15 PROBLEM — D22.5 MELANOCYTIC NEVI OF TRUNK: Status: ACTIVE | Noted: 2023-03-15

## 2023-03-15 PROCEDURE — OTHER MIPS QUALITY: OTHER

## 2023-03-15 PROCEDURE — OTHER LIQUID NITROGEN: OTHER

## 2023-03-15 PROCEDURE — 17003 DESTRUCT PREMALG LES 2-14: CPT | Mod: 59

## 2023-03-15 PROCEDURE — OTHER REASSURANCE: OTHER

## 2023-03-15 PROCEDURE — 17000 DESTRUCT PREMALG LESION: CPT | Mod: 59

## 2023-03-15 PROCEDURE — OTHER COUNSELING: OTHER

## 2023-03-15 PROCEDURE — 17110 DESTRUCT B9 LESION 1-14: CPT

## 2023-03-15 PROCEDURE — OTHER RECORDS REVIEWED: OTHER

## 2023-03-15 PROCEDURE — OTHER SUNSCREEN RECOMMENDATIONS: OTHER

## 2023-03-15 PROCEDURE — 99213 OFFICE O/P EST LOW 20 MIN: CPT | Mod: 25

## 2023-03-15 ASSESSMENT — LOCATION SIMPLE DESCRIPTION DERM
LOCATION SIMPLE: RIGHT UPPER BACK
LOCATION SIMPLE: RIGHT LOWER BACK
LOCATION SIMPLE: SCALP
LOCATION SIMPLE: LEFT SCALP
LOCATION SIMPLE: ABDOMEN
LOCATION SIMPLE: CHEST
LOCATION SIMPLE: NOSE

## 2023-03-15 ASSESSMENT — LOCATION DETAILED DESCRIPTION DERM
LOCATION DETAILED: RIGHT SUPERIOR PARIETAL SCALP
LOCATION DETAILED: RIGHT SUPERIOR MEDIAL LOWER BACK
LOCATION DETAILED: LEFT MEDIAL FRONTAL SCALP
LOCATION DETAILED: NASAL DORSUM
LOCATION DETAILED: MIDDLE STERNUM
LOCATION DETAILED: PERIUMBILICAL SKIN
LOCATION DETAILED: RIGHT MID-UPPER BACK

## 2023-03-15 ASSESSMENT — LOCATION ZONE DERM
LOCATION ZONE: TRUNK
LOCATION ZONE: NOSE
LOCATION ZONE: SCALP

## 2023-03-15 NOTE — PROCEDURE: LIQUID NITROGEN
Add 52 Modifier (Optional): no
Show Aperture Variable?: Yes
Medical Necessity Information: It is in your best interest to select a reason for this procedure from the list below. All of these items fulfill various CMS LCD requirements except the new and changing color options.
Post-Care Instructions: I reviewed with the patient in detail post-care instructions. Patient is to wear sunprotection, and avoid picking at any of the treated lesions. Pt may apply Vaseline to crusted or scabbing areas.
Number Of Freeze-Thaw Cycles: 2 freeze-thaw cycles
Consent: The patient's consent was obtained including but not limited to risks of crusting, scabbing, blistering, scarring, darker or lighter pigmentary change, recurrence, incomplete removal and infection.
Detail Level: Detailed
Application Tool (Optional): Liquid Nitrogen Sprayer
Duration Of Freeze Thaw-Cycle (Seconds): 5-10
Medical Necessity Clause: This procedure was medically necessary because the lesions that were treated were:
Spray Paint Text: The liquid nitrogen was applied to the skin utilizing a spray paint frosting technique.
Duration Of Freeze Thaw-Cycle (Seconds): 5

## 2023-10-15 NOTE — PROCEDURE: SUPERFICIAL RADIATION TREATMENT
Show Ultrasound In Note?: Yes
Ultrasound Used Text: Ultrasound was utilized to place radiation therapy fields.
Fractions / Week Rx 6: 5
Treatment Time In Min (Optional): .45
Dimensions-Y Axis In Cm: 1.2
Change Daily Dosage Administered Mid Treatment?: No
Daily Fractionated Dose (Optional- Include Units): 277.65 cGy
Port Dimensions-Y Axis In Cm: 1.7
Total Number Of Fractions Rx 3: 15
Number Of Treatment Days: 1
Fractionation Number: 9
Initial Radiation Treatment Planning (Will Render If Bill Simulation = Yes): The patient had a complete consultation regarding all applicable modalities for the treatment of their skin cancer and based on a variety of factors including the type of tumor, size, and location, the relevant medical history as well as local tissue factors, the functional status of the individual, the ability to perform necessary postoperative wound instructions and the need for simultaneous treatments as well as overall wound healing status, it was determined that the patient would begin radiation therapy treatment for skin cancer.  A full simulation and treatment device design was performed including the determination and formulation of appropriate simple and complex devices including lead shield of 0.762 mm thickness to form molded customized shielding to specifically correlate with the lesion size including treatment margin.  The custom lead shield is adequate to accommodate the appropriate applicator and provide adequate shielding around the treatment site.  The specific field applicator, shields, and devices both simple and complex as well as the specific patient setup is outlined below.  The patient was given a full consent for superficial radiation to both verbally and in writing and the full determination of patient's eligibility for treatment and selection is outlined on the patient eligibility and treatment selection form.  The specific superficial radiotherapy prescription was determined and was documented on the superficial radiotherapy prescription form.  A treatment calculation was also performed and documented on the treatment calculation form.  Based on the prescription, the patient was scheduled for a series of fractional treatments.
Field Size (Applicator): 2.5 cm
Energy (Include Units): 100
Energy (Optional-Please Include Units): 100 kV
Radiation Units: cGy
Treatment Time / Fractionation (Optional- Include Units): .45 min
Patient Positioning: Sitting
Information: Selecting Yes will display possible errors in your note based on the variables you have selected. This validation is only offered as a suggestion for you. PLEASE NOTE THAT THE VALIDATION TEXT WILL BE REMOVED WHEN YOU FINALIZE YOUR NOTE. IF YOU WANT TO FAX A PRELIMINARY NOTE YOU WILL NEED TO TOGGLE THIS TO 'NO' IF YOU DO NOT WANT IT IN YOUR FAXED NOTE.
Day Of The Week Treatment Administered: Thursday
Intro Statement (Will Not Render If Left Blank): The patient is undergoing superficial radiation therapy for skin cancer and presents for weekly \\nevaluation and management. Per protocol and as documented on the flow sheet, the patient was questioned \\perry to subjective redness, pruritus, pain, drainage, fatigue, or any other symptoms. Objectively, the radiation \\narea was evaluated with regards to erythema, atrophy, scale, crusting, erosion, ulceration, edema, purpura, \\ntenderness, warmth, drainage, and any other findings. The plan was extensively reviewed including dose and \\ndosing schedule. The simulation and clinical setup were also reviewed as were external \\nshields and based on this review the appropriateness and sufficiency of treatment was determined.\\nA high-frequency ultrasound image was acquired today for a two-dimensional evaluation of the tumor \\nvolume, depth, width, breadth, review of prior response to treatment, provide geometric accuracy of field \\nplacement, and determine whether to proceed with therapeutic delivery.\\n\\nHigh frequency ultrasound depth is 2.34 mm, which is + 0.14 mm in difference from previous imaging.\\n\\nPer Dr. ROSE Arias, continued ultrasound guidance and therapeutic radiology simulation-aided field setting \\nverification per fraction is required for field placement, measurement of tumor depth, tissues evaluation, \\nprogress, acute effect monitoring, and determination for therapeutic treatment delivery is appropriate.\\nSubjective:\\nRedness\\nObjective:\\nErythema\\nAssessment:\\nAppropriate reaction\\nPlan:\\nContinue current treatment regimen\\nComments:
Simple Simulation Preamble Text Will Be Included With Simple Simulations (.......... Indications): Simple simulation was performed today for the following reasons:
Total Number Of Fractions: 20
Functional Status: 0 (fully active)
Depth (Optional-Please Include Units): 2.86
Detail Level: Detailed
English
Total Dose (Optional-Please Include Units): 5553.00 cGy
Fractions / Week: 3
Custom Shielding Preamble Text Will Not Be Included With Simple Simulations (.......... X X Y Cm): A lead shield of 0.762 mm thickness is utilized to form a molded, custom shield with a
Ultrasound Used Text: .
Dose / Tx In Cgy (Optional): 277.65
Treatment Margins In Cm: 0.5
Treatment Device Design After Initial Simulation Justification (Will Render If Bill For Treatment Devices = Yes): The patient is status post radiation simulation and is evaluated as to the use of additional devices for shielding and placement for radiation therapy.
Field Size (Applicator): 2.0 cm
Time Dose Fractionation (Optional- Include Units If Applicable): 99
Assessment: Appropriate reaction
Cumulative Dose In Cgy (Optional): 2498.20
Please Choose The Type Of Visit (Required): Treatment Visit: Show Treatment Variables
Custom Shielding Afterword Text Will Not Be Included With Simple Simulations (X X Y Cm............): port to correlate with the lesion size, including treatment margin. The custom lead shield is adequate to accommodate the appropriate applicator and provide adequate shielding around the treatment site. Additional shielding (as noted below) is used to protect sensitive, normal tissues.
Use Automated Fraction Number And Cumulative Dosage?: No (Maintain Current Freetext Entry)